# Patient Record
Sex: FEMALE | Race: WHITE | NOT HISPANIC OR LATINO | Employment: OTHER | URBAN - METROPOLITAN AREA
[De-identification: names, ages, dates, MRNs, and addresses within clinical notes are randomized per-mention and may not be internally consistent; named-entity substitution may affect disease eponyms.]

---

## 2020-01-27 NOTE — TELEPHONE ENCOUNTER
DIAGNOSIS: L foot pain / uncertain of dates of surgery and imaging / [facility] to send records / appt per pt   APPOINTMENT DATE: 3/10   NOTES STATUS DETAILS   OFFICE NOTE from referring provider N/A    OFFICE NOTE from other specialist Care Everywhere [facility]   DISCHARGE SUMMARY from hospital N/A    DISCHARGE REPORT from the ER N/A    OPERATIVE REPORT Care Everywhere [facility]   8/2/07  10/31/06   MEDICATION LIST Care Everywhere    MRI n/a    CT SCAN N/A    XRAYS (IMAGES & REPORTS) recieved tara 11/4/19           Sent fax request to [facility]ara for imaging 1/27    Action 3.5.20 sv    Action Taken Received image 11/4/19 from [facility]

## 2020-01-28 ENCOUNTER — DOCUMENTATION ONLY (OUTPATIENT)
Dept: CARE COORDINATION | Facility: CLINIC | Age: 70
End: 2020-01-28

## 2020-02-25 DIAGNOSIS — M79.672 LEFT FOOT PAIN: Primary | ICD-10-CM

## 2020-03-10 ENCOUNTER — PRE VISIT (OUTPATIENT)
Dept: ORTHOPEDICS | Facility: CLINIC | Age: 70
End: 2020-03-10

## 2020-04-21 ENCOUNTER — TELEPHONE (OUTPATIENT)
Dept: ORTHOPEDICS | Facility: CLINIC | Age: 70
End: 2020-04-21

## 2020-04-21 NOTE — TELEPHONE ENCOUNTER
Called patient to reschedule due to COVID crisis. LVM to call back and reschedule.    Layne Hartmann, ATC

## 2020-04-28 ENCOUNTER — TELEPHONE (OUTPATIENT)
Dept: ORTHOPEDICS | Facility: CLINIC | Age: 70
End: 2020-04-28

## 2020-04-28 NOTE — TELEPHONE ENCOUNTER
Called pt and LVM to reschedule appt 6-8 weeks from date of appt due to COVID 19. Left clinic number.

## 2020-04-29 ENCOUNTER — TELEPHONE (OUTPATIENT)
Dept: ORTHOPEDICS | Facility: CLINIC | Age: 70
End: 2020-04-29

## 2020-05-05 ENCOUNTER — PRE VISIT (OUTPATIENT)
Dept: ORTHOPEDICS | Facility: CLINIC | Age: 70
End: 2020-05-05

## 2020-06-02 ENCOUNTER — OFFICE VISIT (OUTPATIENT)
Dept: ORTHOPEDICS | Facility: CLINIC | Age: 70
End: 2020-06-02
Payer: COMMERCIAL

## 2020-06-02 ENCOUNTER — ANCILLARY PROCEDURE (OUTPATIENT)
Dept: GENERAL RADIOLOGY | Facility: CLINIC | Age: 70
End: 2020-06-02
Attending: ORTHOPAEDIC SURGERY
Payer: COMMERCIAL

## 2020-06-02 VITALS — BODY MASS INDEX: 28.77 KG/M2 | WEIGHT: 179 LBS | HEIGHT: 66 IN

## 2020-06-02 DIAGNOSIS — M25.572 PAIN IN JOINT, ANKLE AND FOOT, LEFT: Primary | ICD-10-CM

## 2020-06-02 DIAGNOSIS — M79.672 LEFT FOOT PAIN: ICD-10-CM

## 2020-06-02 RX ORDER — OXYCODONE HYDROCHLORIDE 10 MG/1
5 TABLET ORAL EVERY 6 HOURS PRN
COMMUNITY
Start: 2020-05-27

## 2020-06-02 RX ORDER — LISINOPRIL 10 MG/1
TABLET ORAL
COMMUNITY
Start: 2019-07-30 | End: 2023-05-13

## 2020-06-02 RX ORDER — SERTRALINE HYDROCHLORIDE 100 MG/1
150 TABLET, FILM COATED ORAL
COMMUNITY
Start: 2020-04-05 | End: 2023-05-13

## 2020-06-02 RX ORDER — TRIAMCINOLONE ACETONIDE 1 MG/G
CREAM TOPICAL
COMMUNITY
Start: 2020-03-23 | End: 2023-05-13

## 2020-06-02 RX ORDER — CLONIDINE HYDROCHLORIDE 0.1 MG/1
TABLET ORAL
COMMUNITY
Start: 2019-08-12 | End: 2023-05-13

## 2020-06-02 RX ORDER — LORAZEPAM 0.5 MG/1
0.25 TABLET ORAL EVERY 8 HOURS PRN
COMMUNITY
Start: 2020-03-23

## 2020-06-02 RX ORDER — NAPROXEN 500 MG/1
500 TABLET ORAL 2 TIMES DAILY PRN
COMMUNITY
Start: 2019-12-04 | End: 2023-10-08

## 2020-06-02 RX ORDER — ATORVASTATIN CALCIUM 80 MG/1
TABLET, FILM COATED ORAL
COMMUNITY
Start: 2019-10-24 | End: 2023-05-13

## 2020-06-02 ASSESSMENT — MIFFLIN-ST. JEOR: SCORE: 1353.69

## 2020-06-02 NOTE — LETTER
6/2/2020         RE: Patricia Mcnamara  [address] [place] [address]        Dear Colleague,    Thank you for referring your patient, Patricia Mcnamara, to the [facility]. Please see a copy of my visit note below.    CHIEF COMPLAINT:  Left foot pain.      HISTORY OF PRESENT ILLNESS:  Ms. Mcnamara is a 70-year-old female who presents today for evaluation of the left foot.  The patient reports to have pain and discomfort along the left foot which is in a variety of occasions.  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  Reports now to be very physically active.      The patient has had 2 surgeries with Dr. Jw Hayes and 1 by Dr. Calderón at [facility] who is now in a different practice.      She reports to have enough pain and discomfort to the point that she may consider surgery.  However, she is very clear about the fact that she scared of having surgery and she is not interested at all in undergoing surgery.      The patient has not had any formal treatment for the foot.  She will point out that the main area of pain and discomfort for her will be the lateral aspect of the foot.  She also reports to have a struggle with clawing of the lesser toes.      She reports to be a smoker and also reports to have gained some weight, which she believes is secondary to her [facility].      PAST MEDICAL HISTORY:  Hypertension, high cholesterol, among others.      PAST SURGICAL HISTORY:  Reviewed today.      DRUG ALLERGIES:  Sulfa drugs.      CURRENT MEDICATIONS:  Please refer to encounter form.      PHYSICAL EXAMINATION:  On today's visit, she presents as a pleasant female in no apparent distress with a height of 5 feet 6 inches and a weight of 179 pounds.  Denies to have any constitutional symptoms.      On today's visit, she presents with full range of motion of the left ankle,  hindfoot and midfoot joints.  CMS intact.  Skin intact.  Presents with some slight decrease for inversion and eversion but still presents approximately 70% of expected.  There are well-healed surgical incisions.  Presents with some swelling along the dorsal aspect of the naviculocuneiform joints.  Alignment of the toes is excellent.      RADIOGRAPHIC EVALUATION:  Three views of the left foot were obtained today which were significant for showing some hardware across the former first tarsometatarsal joint, which seems to be completely healed and in excellent alignment.  She also presented with some hardware across the calcaneocuboid joint and possible lateral column lengthening.  However, this one does not seem to be healing, though there is no failure of the hardware.      The patient otherwise presents with no acute findings.      ASSESSMENT:    1.  Left midfoot arthritis.   2.  Status post left first tarsometatarsal joint arthrodesis.   3.  Possible left calcaneocuboid joint nonunion.      PLAN:  I discussed with the patient that at this point, unfortunately, the only way we will be able to improve her discomfort will be by performing a procedure, something that she is not very much in favor off.      For the time being, she would like to do some thinking and she will contact us if she wishes to proceed with a different use of anti-inflammatory medications as currently she is using Aleve.  I discussed with her that maybe the use of Voltaren 75 mg p.o. b.i.d. will be of benefit to her.      The alternative to that approach will be to consider a CT scan with the understanding that we will obtain a CT scan if, in fact, she has an interest in undergoing a surgical intervention.  CT scan will be obtained on the left foot to rule out nonunion.      All questions were answered.  Patient was pleased with the discussion.  The patient will follow up accordingly.  In the meantime, she has no activity restrictions.      TT 30  minutes, CT 20 minutes.         Again, thank you for allowing me to participate in the care of your patient.        Sincerely,        Crow Thomas MD

## 2020-06-02 NOTE — NURSING NOTE
"Reason For Visit:   Chief Complaint   Patient presents with     Consult     Left foot pain. Patient has had three prior surgeries. Last surgery is when the pain begin.        Ht 1.676 m (5' 6\")   Wt 81.2 kg (179 lb)   BMI 28.89 kg/m      Pain Assessment  Patient Currently in Pain: Yes  0-10 Pain Scale: 7  Primary Pain Location: Foot    Layne Hartmann ATC    "

## 2020-06-02 NOTE — PROGRESS NOTES
CHIEF COMPLAINT:  Left foot pain.      HISTORY OF PRESENT ILLNESS:  Ms. Mcnamara is a 70-year-old female who presents today for evaluation of the left foot.  The patient reports to have pain and discomfort along the left foot which is in a variety of occasions.  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  Reports now to be very physically active.      The patient has had 2 surgeries with Dr. Jw Hayes and 1 by Dr. Calderón at [facility] who is now in a different practice.      She reports to have enough pain and discomfort to the point that she may consider surgery.  However, she is very clear about the fact that she scared of having surgery and she is not interested at all in undergoing surgery.      The patient has not had any formal treatment for the foot.  She will point out that the main area of pain and discomfort for her will be the lateral aspect of the foot.  She also reports to have a struggle with clawing of the lesser toes.      She reports to be a smoker and also reports to have gained some weight, which she believes is secondary to her custodial.      PAST MEDICAL HISTORY:  Hypertension, high cholesterol, among others.      PAST SURGICAL HISTORY:  Reviewed today.      DRUG ALLERGIES:  Sulfa drugs.      CURRENT MEDICATIONS:  Please refer to encounter form.      PHYSICAL EXAMINATION:  On today's visit, she presents as a pleasant female in no apparent distress with a height of 5 feet 6 inches and a weight of 179 pounds.  Denies to have any constitutional symptoms.      On today's visit, she presents with full range of motion of the left ankle, hindfoot and midfoot joints.  CMS intact.  Skin intact.  Presents with some slight decrease for inversion and eversion but still presents approximately 70% of expected.  There are well-healed surgical incisions.  Presents with some  swelling along the dorsal aspect of the naviculocuneiform joints.  Alignment of the toes is excellent.      RADIOGRAPHIC EVALUATION:  Three views of the left foot were obtained today which were significant for showing some hardware across the former first tarsometatarsal joint, which seems to be completely healed and in excellent alignment.  She also presented with some hardware across the calcaneocuboid joint and possible lateral column lengthening.  However, this one does not seem to be healing, though there is no failure of the hardware.      The patient otherwise presents with no acute findings.      ASSESSMENT:    1.  Left midfoot arthritis.   2.  Status post left first tarsometatarsal joint arthrodesis.   3.  Possible left calcaneocuboid joint nonunion.      PLAN:  I discussed with the patient that at this point, unfortunately, the only way we will be able to improve her discomfort will be by performing a procedure, something that she is not very much in favor off.      For the time being, she would like to do some thinking and she will contact us if she wishes to proceed with a different use of anti-inflammatory medications as currently she is using Aleve.  I discussed with her that maybe the use of Voltaren 75 mg p.o. b.i.d. will be of benefit to her.      The alternative to that approach will be to consider a CT scan with the understanding that we will obtain a CT scan if, in fact, she has an interest in undergoing a surgical intervention.  CT scan will be obtained on the left foot to rule out nonunion.      All questions were answered.  Patient was pleased with the discussion.  The patient will follow up accordingly.  In the meantime, she has no activity restrictions.      TT 30 minutes, CT 20 minutes.

## 2020-07-28 ENCOUNTER — TELEPHONE (OUTPATIENT)
Dept: ORTHOPEDICS | Facility: CLINIC | Age: 70
End: 2020-07-28

## 2020-07-28 DIAGNOSIS — M79.672 LEFT FOOT PAIN: Primary | ICD-10-CM

## 2020-07-28 NOTE — TELEPHONE ENCOUNTER
Received voicemail message from patient requesting to let Dory know that she would like to proceed with surgery with Dr Thomas. Patient can be reached at [phone], and is best reachable after 12:00 or 1:00pm.

## 2020-08-03 ENCOUNTER — TELEPHONE (OUTPATIENT)
Dept: ORTHOPEDICS | Facility: CLINIC | Age: 70
End: 2020-08-03

## 2020-08-03 NOTE — TELEPHONE ENCOUNTER
Patricia was called back by RN and we discussed the plan to have telephone visit next week after her CT scan.  Pt states she would like to plan for surgery on Wednesday September 2nd.   [employer] access letter was mailed to pt's home per pt request.  Dory Soni RN

## 2020-08-03 NOTE — TELEPHONE ENCOUNTER
[facility] Call Center    Phone Message    May a detailed message be left on voicemail: yes     Reason for Call: Other:   Pt realized that she has a telephone visit with William tomorrow to discuss the CAT scan which pt is not having done until Thursday. Pt wonders how long it will take for pt to get the scan results back? And pt would like to schedule the telephone appt accordingly. Please call back to advise and help schedule.     Action Taken: Other:  ortho    Travel Screening: Not Applicable

## 2020-08-03 NOTE — LETTER
[employer] Customer Service  [facility]  [address], [address]  [place] [address]  Fax: [phone]  Phone: [phone]      August 3, 2020      Patricia Mcnamara  [address] [place] [address]        Dear Patricia,    Thank you for your interest in becoming a [employer] user!    Your access code is: [email or web address]  Expires: 2020  6:31 AM     Please access the [employer] website at [email or web address].  Below the ID and password fields, select the  Sign Up Now  as New User.  You will be prompted to enter the access code listed above as well as additional personal information.  Please follow the directions carefully when creating your username and password.    If you allow your access code to , or if you have any questions please call a [employer] Representative at [phone] during normal clinic hours.     Sincerely,      [employer] Customer Service  [facility]

## 2020-08-06 ENCOUNTER — ANCILLARY PROCEDURE (OUTPATIENT)
Dept: CT IMAGING | Facility: CLINIC | Age: 70
End: 2020-08-06
Attending: ORTHOPAEDIC SURGERY
Payer: COMMERCIAL

## 2020-08-06 DIAGNOSIS — M79.672 LEFT FOOT PAIN: ICD-10-CM

## 2020-08-11 ENCOUNTER — VIRTUAL VISIT (OUTPATIENT)
Dept: ORTHOPEDICS | Facility: CLINIC | Age: 70
End: 2020-08-11
Payer: COMMERCIAL

## 2020-08-11 DIAGNOSIS — M25.572 PAIN IN JOINT, ANKLE AND FOOT, LEFT: Primary | ICD-10-CM

## 2020-08-11 NOTE — PROGRESS NOTES
CHIEF COMPLAINT:   1.  Left midfoot arthritis.   2.  Left calcaneocuboid joint nonunion.   3.  Status post left first tarsometatarsal joint arthrodesis.      HISTORY OF PRESENT ILLNESS:  Mrs. Mcnamara was contacted today via phone with her approval secondary to the pandemic.      RADIOGRAPHIC EVALUATION:  A CT scan has been obtained which is significant for showing a nonunion of the calcaneocuboid joint with a large amount of osteoarthritis across the naviculocuneiform joints and to a lesser degree but is still significant across the talonavicular joint.      PLAN:  I discussed with the patient that if, in fact, she is interested in undergoing a surgical procedure to improve her condition, we will consider the possibility of a diagnostic injection across the left talonavicular and naviculocuneiform joints to understand if, in fact, the surgery will provide her with relief that she is expecting.      Once she responds positively to the injections, then she will return to clinic to discuss in more detail and in depth with regards to what it would entail to recover from the surgery.      All questions were answered.  The patient was pleased with the discussion.  The patient has no activity restrictions.      TT:  15 minutes.  CT:  10 minutes.

## 2020-08-11 NOTE — LETTER
8/11/2020         RE: Patricia Mcnamara  [address] [place] [address]        Dear Colleague,    Thank you for referring your patient, Patricia Mcnamara, to the [facility]. Please see a copy of my visit note below.    CHIEF COMPLAINT:   1.  Left midfoot arthritis.   2.  Left calcaneocuboid joint nonunion.   3.  Status post left first tarsometatarsal joint arthrodesis.      HISTORY OF PRESENT ILLNESS:  Mrs. Mcnamara was contacted today via phone with her approval secondary to the pandemic.      RADIOGRAPHIC EVALUATION:  A CT scan has been obtained which is significant for showing a nonunion of the calcaneocuboid joint with a large amount of osteoarthritis across the naviculocuneiform joints and to a lesser degree but is still significant across the talonavicular joint.      PLAN:  I discussed with the patient that if, in fact, she is interested in undergoing a surgical procedure to improve her condition, we will consider the possibility of a diagnostic injection across the left talonavicular and naviculocuneiform joints to understand if, in fact, the surgery will provide her with relief that she is expecting.      Once she responds positively to the injections, then she will return to clinic to discuss in more detail and in depth with regards to what it would entail to recover from the surgery.      All questions were answered.  The patient was pleased with the discussion.  The patient has no activity restrictions.      TT:  15 minutes.  CT:  10 minutes.     Crow Thomas MD

## 2020-08-27 ENCOUNTER — TELEPHONE (OUTPATIENT)
Dept: ORTHOPEDICS | Facility: CLINIC | Age: 70
End: 2020-08-27

## 2020-08-27 DIAGNOSIS — Z11.59 ENCOUNTER FOR SCREENING FOR OTHER VIRAL DISEASES: Primary | ICD-10-CM

## 2020-08-27 DIAGNOSIS — M25.572 PAIN IN JOINT, ANKLE AND FOOT, LEFT: Primary | ICD-10-CM

## 2020-08-27 NOTE — TELEPHONE ENCOUNTER
Patricia was called back by RN and we reviewed the plan for diagnostic and therapeutic left talonavicular and naviculocuneiform joint injections.  A pain log will be mailed to pt's home to help document the effect of the medications, local and steroid.  Dory Soni RN

## 2020-08-27 NOTE — TELEPHONE ENCOUNTER
[facility] Call Center    Phone Message    May a detailed message be left on voicemail: yes     Reason for Call: Other: patient was needing to know next steps, she thought something was supposed to be scheduled for her. please call pt back     Action Taken: Message routed to:  [facility] Dr Crow Thomas    Travel Screening: Not Applicable

## 2020-08-28 DIAGNOSIS — Z11.59 ENCOUNTER FOR SCREENING FOR OTHER VIRAL DISEASES: ICD-10-CM

## 2020-08-28 PROCEDURE — U0003 INFECTIOUS AGENT DETECTION BY NUCLEIC ACID (DNA OR RNA); SEVERE ACUTE RESPIRATORY SYNDROME CORONAVIRUS 2 (SARS-COV-2) (CORONAVIRUS DISEASE [COVID-19]), AMPLIFIED PROBE TECHNIQUE, MAKING USE OF HIGH THROUGHPUT TECHNOLOGIES AS DESCRIBED BY CMS-2020-01-R: HCPCS | Performed by: ORTHOPAEDIC SURGERY

## 2020-08-29 LAB
SARS-COV-2 RNA SPEC QL NAA+PROBE: NOT DETECTED
SPECIMEN SOURCE: NORMAL

## 2020-09-01 ENCOUNTER — ANCILLARY PROCEDURE (OUTPATIENT)
Dept: GENERAL RADIOLOGY | Facility: CLINIC | Age: 70
End: 2020-09-01
Attending: ORTHOPAEDIC SURGERY
Payer: COMMERCIAL

## 2020-09-01 DIAGNOSIS — M25.572 PAIN IN JOINT, ANKLE AND FOOT, LEFT: ICD-10-CM

## 2020-09-01 RX ORDER — BUPIVACAINE HYDROCHLORIDE 2.5 MG/ML
10 INJECTION, SOLUTION EPIDURAL; INFILTRATION; INTRACAUDAL ONCE
Status: COMPLETED | OUTPATIENT
Start: 2020-09-01 | End: 2020-09-01

## 2020-09-01 RX ORDER — TRIAMCINOLONE ACETONIDE 40 MG/ML
40 INJECTION, SUSPENSION INTRA-ARTICULAR; INTRAMUSCULAR ONCE
Status: COMPLETED | OUTPATIENT
Start: 2020-09-01 | End: 2020-09-01

## 2020-09-01 RX ORDER — IOPAMIDOL 408 MG/ML
20 INJECTION, SOLUTION INTRATHECAL ONCE
Status: COMPLETED | OUTPATIENT
Start: 2020-09-01 | End: 2020-09-01

## 2020-09-01 RX ORDER — LIDOCAINE HYDROCHLORIDE 10 MG/ML
30 INJECTION, SOLUTION EPIDURAL; INFILTRATION; INTRACAUDAL; PERINEURAL ONCE
Status: COMPLETED | OUTPATIENT
Start: 2020-09-01 | End: 2020-09-01

## 2020-09-01 RX ADMIN — TRIAMCINOLONE ACETONIDE 40 MG: 40 INJECTION, SUSPENSION INTRA-ARTICULAR; INTRAMUSCULAR at 10:47

## 2020-09-01 RX ADMIN — BUPIVACAINE HYDROCHLORIDE 10 MG: 2.5 INJECTION, SOLUTION EPIDURAL; INFILTRATION; INTRACAUDAL at 10:49

## 2020-09-01 RX ADMIN — LIDOCAINE HYDROCHLORIDE 5 ML: 10 INJECTION, SOLUTION EPIDURAL; INFILTRATION; INTRACAUDAL; PERINEURAL at 10:46

## 2020-09-01 RX ADMIN — IOPAMIDOL 2 ML: 408 INJECTION, SOLUTION INTRATHECAL at 10:46

## 2020-11-23 ENCOUNTER — TELEPHONE (OUTPATIENT)
Dept: ORTHOPEDICS | Facility: CLINIC | Age: 70
End: 2020-11-23

## 2020-11-23 NOTE — TELEPHONE ENCOUNTER
I called Justine back this afternoon. She was calling to discuss her pain chart s/p injection that was completed 9/1. She said she got busy and forgot to call back. She is interested in having another injection rather than going to surgery. She states the injection did help and she would like another one. I helped her set up a phone call with Dr. Thomas for tomorrow.     Layne MENJIVAR ATC

## 2020-11-23 NOTE — TELEPHONE ENCOUNTER
[facility] Call Center    Phone Message    May a detailed message be left on voicemail: no     Reason for Call: Other: Patient returning a call from [place]     Action Taken: Message routed to:  [facility]    Travel Screening: Not Applicable

## 2020-11-24 ENCOUNTER — VIRTUAL VISIT (OUTPATIENT)
Dept: ORTHOPEDICS | Facility: CLINIC | Age: 70
End: 2020-11-24
Payer: COMMERCIAL

## 2020-11-24 DIAGNOSIS — M25.572 PAIN IN JOINT, ANKLE AND FOOT, LEFT: Primary | ICD-10-CM

## 2020-11-24 PROCEDURE — 99212 OFFICE O/P EST SF 10 MIN: CPT | Mod: TEL | Performed by: ORTHOPAEDIC SURGERY

## 2020-11-24 NOTE — LETTER
11/24/2020         RE: Patricia Mcnamara  [address] [place] [address]        Dear Colleague,    Thank you for referring your patient, Patricia Mcnamara, to the [facility] [place]. Please see a copy of my visit note below.    TELEPHONE VISIT      CHIEF COMPLAINT:  Left foot arthritis.      HISTORY OF PRESENT ILLNESS:  Mrs. Mcnamara was contacted today via phone secondary to the pandemic.  Patient authorized the encounter.      The patient reports to have quite a bit of help from the injection, which was performed on 09/01/2020.  The injection consisted of a naviculocuneiform and talonavicular joint injections.      The patient reports to have an interest in pursuing injections and to postpone surgery as much as possible, which I think is very realistic.      ASSESSMENT:  Left ankle arthritis.      PLAN:  I discussed with the patient that we are going to proceed with an order for a naviculocuneiform and talonavicular joint injections on the left foot with lidocaine and Kenalog for diagnosis of osteoarthritis.  The injections will be performed under fluoroscopic control.      The patient will follow up on a p.r.n. basis and she will be granted to proceed with injections on her left foot, as long as they are 3 months apart.  In the meantime, she has no activity restrictions.      All questions were answered.      TT 15 minutes, CT 10 minutes.           Again, thank you for allowing me to participate in the care of your patient.        Sincerely,        Crow Thomas MD

## 2020-11-24 NOTE — LETTER
Date:December 3, 2020      Patient was self referred, no letter generated. Do not send.        [facility] Health Information

## 2020-11-24 NOTE — PROGRESS NOTES
TELEPHONE VISIT      CHIEF COMPLAINT:  Left foot arthritis.      HISTORY OF PRESENT ILLNESS:  Mrs. Mcnamara was contacted today via phone secondary to the pandemic.  Patient authorized the encounter.      The patient reports to have quite a bit of help from the injection, which was performed on 09/01/2020.  The injection consisted of a naviculocuneiform and talonavicular joint injections.      The patient reports to have an interest in pursuing injections and to postpone surgery as much as possible, which I think is very realistic.      ASSESSMENT:  Left ankle arthritis.      PLAN:  I discussed with the patient that we are going to proceed with an order for a naviculocuneiform and talonavicular joint injections on the left foot with lidocaine and Kenalog for diagnosis of osteoarthritis.  The injections will be performed under fluoroscopic control.      The patient will follow up on a p.r.n. basis and she will be granted to proceed with injections on her left foot, as long as they are 3 months apart.  In the meantime, she has no activity restrictions.      All questions were answered.      TT 15 minutes, CT 10 minutes.

## 2020-12-03 ENCOUNTER — TELEPHONE (OUTPATIENT)
Dept: ORTHOPEDICS | Facility: CLINIC | Age: 70
End: 2020-12-03

## 2020-12-03 DIAGNOSIS — M25.572 PAIN IN JOINT, ANKLE AND FOOT, LEFT: Primary | ICD-10-CM

## 2020-12-03 NOTE — TELEPHONE ENCOUNTER
[facility] Call Center    Phone Message    May a detailed message be left on voicemail: yes     Reason for Call: Other: Pt is calling in to speak with Dory about her injections that Dr. Thomas wanted her to have, Pt is requesting Dory call you back      Action Taken: Message routed to:  [facility] Ortho    Travel Screening: Not Applicable

## 2020-12-07 DIAGNOSIS — Z11.59 ENCOUNTER FOR SCREENING FOR OTHER VIRAL DISEASES: Primary | ICD-10-CM

## 2020-12-07 NOTE — TELEPHONE ENCOUNTER
Patricia was called back by RN and we reviewed getting injections into her foot joints per Dr Thomas's note.  Patricia is not diabetic, on no blood thinners.  She was given radiology scheduling number to set it up.  Dory Adams RN

## 2021-01-09 DIAGNOSIS — Z11.59 ENCOUNTER FOR SCREENING FOR OTHER VIRAL DISEASES: ICD-10-CM

## 2021-01-09 LAB
SARS-COV-2 RNA RESP QL NAA+PROBE: NORMAL
SPECIMEN SOURCE: NORMAL

## 2021-01-09 PROCEDURE — 87635 SARS-COV-2 COVID-19 AMP PRB: CPT | Performed by: PATHOLOGY

## 2021-01-10 ENCOUNTER — HEALTH MAINTENANCE LETTER (OUTPATIENT)
Age: 71
End: 2021-01-10

## 2021-01-10 LAB
LABORATORY COMMENT REPORT: NORMAL
SARS-COV-2 RNA RESP QL NAA+PROBE: NEGATIVE
SPECIMEN SOURCE: NORMAL

## 2021-01-12 ENCOUNTER — ANCILLARY PROCEDURE (OUTPATIENT)
Dept: GENERAL RADIOLOGY | Facility: CLINIC | Age: 71
End: 2021-01-12
Attending: ORTHOPAEDIC SURGERY
Payer: COMMERCIAL

## 2021-01-12 DIAGNOSIS — M25.572 PAIN IN JOINT, ANKLE AND FOOT, LEFT: ICD-10-CM

## 2021-01-12 PROCEDURE — 77002 NEEDLE LOCALIZATION BY XRAY: CPT | Mod: GC | Performed by: RADIOLOGY

## 2021-01-12 PROCEDURE — 20605 DRAIN/INJ JOINT/BURSA W/O US: CPT | Mod: LT | Performed by: RADIOLOGY

## 2021-01-12 PROCEDURE — 20605 DRAIN/INJ JOINT/BURSA W/O US: CPT | Mod: XS | Performed by: RADIOLOGY

## 2021-01-12 RX ORDER — BUPIVACAINE HYDROCHLORIDE 2.5 MG/ML
10 INJECTION, SOLUTION EPIDURAL; INFILTRATION; INTRACAUDAL ONCE
Status: COMPLETED | OUTPATIENT
Start: 2021-01-12 | End: 2021-01-12

## 2021-01-12 RX ORDER — IOPAMIDOL 408 MG/ML
10 INJECTION, SOLUTION INTRATHECAL ONCE
Status: COMPLETED | OUTPATIENT
Start: 2021-01-12 | End: 2021-01-12

## 2021-01-12 RX ORDER — LIDOCAINE HYDROCHLORIDE 10 MG/ML
30 INJECTION, SOLUTION EPIDURAL; INFILTRATION; INTRACAUDAL; PERINEURAL ONCE
Status: COMPLETED | OUTPATIENT
Start: 2021-01-12 | End: 2021-01-12

## 2021-01-12 RX ORDER — TRIAMCINOLONE ACETONIDE 40 MG/ML
40 INJECTION, SUSPENSION INTRA-ARTICULAR; INTRAMUSCULAR ONCE
Status: COMPLETED | OUTPATIENT
Start: 2021-01-12 | End: 2021-01-12

## 2021-01-12 RX ADMIN — TRIAMCINOLONE ACETONIDE 40 MG: 40 INJECTION, SUSPENSION INTRA-ARTICULAR; INTRAMUSCULAR at 11:34

## 2021-01-12 RX ADMIN — BUPIVACAINE HYDROCHLORIDE 5 MG: 2.5 INJECTION, SOLUTION EPIDURAL; INFILTRATION; INTRACAUDAL at 11:32

## 2021-01-12 RX ADMIN — LIDOCAINE HYDROCHLORIDE 5 ML: 10 INJECTION, SOLUTION EPIDURAL; INFILTRATION; INTRACAUDAL; PERINEURAL at 11:33

## 2021-01-12 RX ADMIN — IOPAMIDOL 2 ML: 408 INJECTION, SOLUTION INTRATHECAL at 11:32

## 2021-03-10 ENCOUNTER — TELEPHONE (OUTPATIENT)
Dept: ORTHOPEDICS | Facility: CLINIC | Age: 71
End: 2021-03-10

## 2021-03-10 NOTE — TELEPHONE ENCOUNTER
[facility] Call Center    Phone Message    May a detailed message be left on voicemail: yes     Reason for Call: Other: This pt of Dr. Thomas's  called to speak with Dory or someone on Dr. Thomas's care team. This pt want to speak with Dory regarding her recent injection. Please have Dory reach back out to this pt at her convenience.     Action Taken: Message routed to:  [facility] Ortho    Travel Screening: Not Applicable

## 2021-03-11 NOTE — TELEPHONE ENCOUNTER
I called and spoke with Patricia this morning regarding her injections. She states that for this last set of injections that she thinks the doctor did the same injections twice as they went in from the top both times. For her injections in September the doctor that gave the injections did one from the top and one from the side of the foot. I took a look at her images from both injections and I do see that the doctors took different approaches but this last injection was put in the correct two places. She will call us back in April to have a repeat injection.     Layne Hartmann, ATC

## 2021-05-03 ENCOUNTER — TELEPHONE (OUTPATIENT)
Dept: ORTHOPEDICS | Facility: CLINIC | Age: 71
End: 2021-05-03

## 2021-05-03 DIAGNOSIS — M25.572 PAIN IN JOINT, ANKLE AND FOOT, LEFT: Primary | ICD-10-CM

## 2021-05-03 NOTE — TELEPHONE ENCOUNTER
Patricia was called back and we reviewed her two injections  Fluoroscopy guided left talonavicular joint steroid injection   Fluoroscopy guided left navicular cuneiform joint steroid injection.    Pt states she will call radiology to schedule.    Dory Soni RN

## 2021-05-03 NOTE — TELEPHONE ENCOUNTER
[facility] Call Center    Phone Message:  Pt would like a call back to have her questions answered about types of injections, and to also schedule an injection.    May a detailed message be left on voicemail: Yes     Reason for Call: Other: INJECTION Questions and Scheduling      Action Taken: Message routed to:  [facility] Team    Travel Screening: Not Applicable

## 2021-05-18 DIAGNOSIS — Z11.59 ENCOUNTER FOR SCREENING FOR OTHER VIRAL DISEASES: ICD-10-CM

## 2021-05-27 ENCOUNTER — RECORDS - HEALTHEAST (OUTPATIENT)
Dept: ADMINISTRATIVE | Facility: CLINIC | Age: 71
End: 2021-05-27

## 2021-05-30 DIAGNOSIS — Z11.59 ENCOUNTER FOR SCREENING FOR OTHER VIRAL DISEASES: ICD-10-CM

## 2021-05-30 LAB
LABORATORY COMMENT REPORT: NORMAL
SARS-COV-2 RNA RESP QL NAA+PROBE: NEGATIVE
SARS-COV-2 RNA RESP QL NAA+PROBE: NORMAL
SPECIMEN SOURCE: NORMAL
SPECIMEN SOURCE: NORMAL

## 2021-05-30 PROCEDURE — U0003 INFECTIOUS AGENT DETECTION BY NUCLEIC ACID (DNA OR RNA); SEVERE ACUTE RESPIRATORY SYNDROME CORONAVIRUS 2 (SARS-COV-2) (CORONAVIRUS DISEASE [COVID-19]), AMPLIFIED PROBE TECHNIQUE, MAKING USE OF HIGH THROUGHPUT TECHNOLOGIES AS DESCRIBED BY CMS-2020-01-R: HCPCS | Performed by: ORTHOPAEDIC SURGERY

## 2021-05-30 PROCEDURE — U0005 INFEC AGEN DETEC AMPLI PROBE: HCPCS | Performed by: ORTHOPAEDIC SURGERY

## 2021-06-01 ENCOUNTER — RECORDS - HEALTHEAST (OUTPATIENT)
Dept: ADMINISTRATIVE | Facility: CLINIC | Age: 71
End: 2021-06-01

## 2021-06-03 ENCOUNTER — ANCILLARY PROCEDURE (OUTPATIENT)
Dept: GENERAL RADIOLOGY | Facility: CLINIC | Age: 71
End: 2021-06-03
Attending: ORTHOPAEDIC SURGERY
Payer: COMMERCIAL

## 2021-06-03 DIAGNOSIS — M25.572 PAIN IN JOINT, ANKLE AND FOOT, LEFT: ICD-10-CM

## 2021-06-03 PROCEDURE — 20605 DRAIN/INJ JOINT/BURSA W/O US: CPT | Mod: LT | Performed by: RADIOLOGY

## 2021-06-03 PROCEDURE — 77002 NEEDLE LOCALIZATION BY XRAY: CPT | Mod: GC | Performed by: RADIOLOGY

## 2021-06-03 PROCEDURE — 20605 DRAIN/INJ JOINT/BURSA W/O US: CPT | Mod: XS | Performed by: RADIOLOGY

## 2021-06-03 RX ORDER — LIDOCAINE HYDROCHLORIDE 10 MG/ML
30 INJECTION, SOLUTION EPIDURAL; INFILTRATION; INTRACAUDAL; PERINEURAL ONCE
Status: COMPLETED | OUTPATIENT
Start: 2021-06-03 | End: 2021-06-03

## 2021-06-03 RX ORDER — IOPAMIDOL 408 MG/ML
10 INJECTION, SOLUTION INTRATHECAL ONCE
Status: COMPLETED | OUTPATIENT
Start: 2021-06-03 | End: 2021-06-03

## 2021-06-03 RX ORDER — BUPIVACAINE HYDROCHLORIDE 2.5 MG/ML
10 INJECTION, SOLUTION EPIDURAL; INFILTRATION; INTRACAUDAL ONCE
Status: COMPLETED | OUTPATIENT
Start: 2021-06-03 | End: 2021-06-03

## 2021-06-03 RX ORDER — TRIAMCINOLONE ACETONIDE 40 MG/ML
40 INJECTION, SUSPENSION INTRA-ARTICULAR; INTRAMUSCULAR ONCE
Status: COMPLETED | OUTPATIENT
Start: 2021-06-03 | End: 2021-06-03

## 2021-06-03 RX ADMIN — BUPIVACAINE HYDROCHLORIDE 10 MG: 2.5 INJECTION, SOLUTION EPIDURAL; INFILTRATION; INTRACAUDAL at 11:34

## 2021-06-03 RX ADMIN — TRIAMCINOLONE ACETONIDE 40 MG: 40 INJECTION, SUSPENSION INTRA-ARTICULAR; INTRAMUSCULAR at 11:34

## 2021-06-03 RX ADMIN — IOPAMIDOL 2 ML: 408 INJECTION, SOLUTION INTRATHECAL at 11:34

## 2021-06-03 RX ADMIN — LIDOCAINE HYDROCHLORIDE 5 ML: 10 INJECTION, SOLUTION EPIDURAL; INFILTRATION; INTRACAUDAL; PERINEURAL at 11:34

## 2021-06-10 ENCOUNTER — TELEPHONE (OUTPATIENT)
Dept: ORTHOPEDICS | Facility: CLINIC | Age: 71
End: 2021-06-10

## 2021-06-10 NOTE — TELEPHONE ENCOUNTER
[facility] Call [facility]    Phone Message    May a detailed message be left on voicemail: yes     Reason for Call: Other: would like Dory to c/b and discuss what the recovery time is going to look like after surgery      Action Taken: Message routed to:  [facility] orjennifer    Travel Screening: Not Applicable     Can call anytime after 1PM

## 2021-06-11 NOTE — TELEPHONE ENCOUNTER
Patricia was called back by RN and voicemail was left that we would be happy to answer her questions when she calls back.  She has been receiving Left Talonavicular and navicular cuneiform injections.  We have not seen a surgery plan, so it's not clear we can say hat surgery Dr Thomas plans.  If surgery, pt will need telephone or clinic visit before scheduling.  Dory Soni RN

## 2021-07-08 ENCOUNTER — TELEPHONE (OUTPATIENT)
Dept: ORTHOPEDICS | Facility: CLINIC | Age: 71
End: 2021-07-08

## 2021-07-08 DIAGNOSIS — M25.572 PAIN IN JOINT, ANKLE AND FOOT, LEFT: ICD-10-CM

## 2021-07-08 DIAGNOSIS — M79.672 LEFT FOOT PAIN: Primary | ICD-10-CM

## 2021-07-08 NOTE — TELEPHONE ENCOUNTER
I called the patient back and let her know that Dory is out of the office today, returning tomorrow. She had questions regarding surgery and getting on the schedule. I will have Dory call her back tomorrow. She asked that Dory wait until after 12pm to call her.     Layne

## 2021-07-08 NOTE — TELEPHONE ENCOUNTER
[facility] Call [facility]    Phone Message    May a detailed message be left on voicemail: yes     Reason for Call: Other: patient would like Dory to call back and go over a few questions she has regarding recovery time and to schedule surgery / get ball rolling -- would like a c/b anytime after 12 pm      Action Taken: Message routed to:  [facility] ortho    Travel Screening: Not Applicable

## 2021-07-09 NOTE — TELEPHONE ENCOUNTER
"Patricia was called back by RN regarding ankle surgery for arthritis.  We will have pt discuss with a telephone visit with Dr Thomas to review what surgery he recommends.  Pt smokes and when asking if she could stop, she states she can \"maybe decrease to 2-3 cigarettes/day\" if she has to around the time of surgery.  Pt takes 6-8 Oxycodone 10mg tablets for her foot and ankle pain, generally 8 tablets/day.  Pt would like to plan surgery in early September this year.  Dory Soni RN  "

## 2021-08-16 NOTE — TELEPHONE ENCOUNTER
Patricia was called back by RN.  Pt states she is too busy right now selling her vacation home in [place] to plan for surgery, would like to postpone until January 12,2022.  She will see Dr Thomas for review of plan on 12/7/21.  Pt asks to have repeat injections for left TN and NC joints since surgery will be postponed.  Pt's last injections were on 6/3/21, will wait until after 9/3/21 for the injections.  Dory Soni RN

## 2021-08-16 NOTE — TELEPHONE ENCOUNTER
[facility] Call Center    Phone Message    May a detailed message be left on voicemail: yes     Reason for Call: Other: This pt requested a call back from Dory when she has a free moment. Dory can reach out to the pt at either number listed in chart.      Action Taken: Message routed to:  [facility]o    Travel Screening: Not Applicable

## 2021-08-23 DIAGNOSIS — Z11.59 ENCOUNTER FOR SCREENING FOR OTHER VIRAL DISEASES: ICD-10-CM

## 2021-09-08 DIAGNOSIS — Z11.59 ENCOUNTER FOR SCREENING FOR OTHER VIRAL DISEASES: ICD-10-CM

## 2021-09-17 NOTE — TELEPHONE ENCOUNTER
[facility] Call Center    Phone Message    May a detailed message be left on voicemail: yes     Reason for Call: Other: This pt of Dr. Thomas's called to speak with Dory. The pt did not state what this was in regard to. Please have Dory reach back out to the pt at [phone].     Action Taken: Message routed to:  [facility] Ortho    Travel Screening: Not Applicable

## 2021-09-17 NOTE — TELEPHONE ENCOUNTER
Called patient back to discuss. I let her know that Dory has retired. Also discussed with her a recent mychart she had received regarding covid test information before her next injection. Patient also wanted it explained again the area in which she receives these injections - we went over this.

## 2021-09-24 ENCOUNTER — LAB (OUTPATIENT)
Dept: LAB | Facility: CLINIC | Age: 71
End: 2021-09-24

## 2021-09-24 DIAGNOSIS — Z11.59 ENCOUNTER FOR SCREENING FOR OTHER VIRAL DISEASES: ICD-10-CM

## 2021-09-24 PROCEDURE — U0005 INFEC AGEN DETEC AMPLI PROBE: HCPCS | Performed by: ORTHOPAEDIC SURGERY

## 2021-09-25 LAB — SARS-COV-2 RNA RESP QL NAA+PROBE: NEGATIVE

## 2021-09-28 ENCOUNTER — ANCILLARY PROCEDURE (OUTPATIENT)
Dept: GENERAL RADIOLOGY | Facility: CLINIC | Age: 71
End: 2021-09-28
Attending: ORTHOPAEDIC SURGERY
Payer: COMMERCIAL

## 2021-09-28 DIAGNOSIS — M25.572 PAIN IN JOINT, ANKLE AND FOOT, LEFT: ICD-10-CM

## 2021-09-28 PROCEDURE — 77002 NEEDLE LOCALIZATION BY XRAY: CPT | Mod: GC | Performed by: RADIOLOGY

## 2021-09-28 PROCEDURE — 20605 DRAIN/INJ JOINT/BURSA W/O US: CPT | Mod: LT | Performed by: RADIOLOGY

## 2021-09-28 RX ORDER — BUPIVACAINE HYDROCHLORIDE 2.5 MG/ML
10 INJECTION, SOLUTION EPIDURAL; INFILTRATION; INTRACAUDAL ONCE
Status: COMPLETED | OUTPATIENT
Start: 2021-09-28 | End: 2021-09-28

## 2021-09-28 RX ORDER — LIDOCAINE HYDROCHLORIDE 10 MG/ML
30 INJECTION, SOLUTION EPIDURAL; INFILTRATION; INTRACAUDAL; PERINEURAL ONCE
Status: COMPLETED | OUTPATIENT
Start: 2021-09-28 | End: 2021-09-28

## 2021-09-28 RX ORDER — IOPAMIDOL 408 MG/ML
10 INJECTION, SOLUTION INTRATHECAL ONCE
Status: COMPLETED | OUTPATIENT
Start: 2021-09-28 | End: 2021-09-28

## 2021-09-28 RX ORDER — TRIAMCINOLONE ACETONIDE 40 MG/ML
40 INJECTION, SUSPENSION INTRA-ARTICULAR; INTRAMUSCULAR ONCE
Status: COMPLETED | OUTPATIENT
Start: 2021-09-28 | End: 2021-09-28

## 2021-09-28 RX ADMIN — IOPAMIDOL 4 ML: 408 INJECTION, SOLUTION INTRATHECAL at 10:22

## 2021-09-28 RX ADMIN — LIDOCAINE HYDROCHLORIDE 5 ML: 10 INJECTION, SOLUTION EPIDURAL; INFILTRATION; INTRACAUDAL; PERINEURAL at 10:22

## 2021-09-28 RX ADMIN — BUPIVACAINE HYDROCHLORIDE 5 MG: 2.5 INJECTION, SOLUTION EPIDURAL; INFILTRATION; INTRACAUDAL at 10:22

## 2021-09-28 RX ADMIN — TRIAMCINOLONE ACETONIDE 40 MG: 40 INJECTION, SUSPENSION INTRA-ARTICULAR; INTRAMUSCULAR at 10:22

## 2021-10-23 ENCOUNTER — HEALTH MAINTENANCE LETTER (OUTPATIENT)
Age: 71
End: 2021-10-23

## 2021-12-07 ENCOUNTER — VIRTUAL VISIT (OUTPATIENT)
Dept: ORTHOPEDICS | Facility: CLINIC | Age: 71
End: 2021-12-07
Payer: COMMERCIAL

## 2021-12-07 ENCOUNTER — IMMUNIZATION (OUTPATIENT)
Dept: NURSING | Facility: CLINIC | Age: 71
End: 2021-12-07

## 2021-12-07 DIAGNOSIS — M25.572 PAIN IN JOINT, ANKLE AND FOOT, LEFT: Primary | ICD-10-CM

## 2021-12-07 PROCEDURE — 99213 OFFICE O/P EST LOW 20 MIN: CPT | Mod: 95 | Performed by: ORTHOPAEDIC SURGERY

## 2021-12-07 PROCEDURE — 91306 COVID-19,PF,MODERNA (18+ YRS BOOSTER .25ML): CPT

## 2021-12-07 PROCEDURE — 0064A COVID-19,PF,MODERNA (18+ YRS BOOSTER .25ML): CPT

## 2021-12-07 PROCEDURE — G0008 ADMIN INFLUENZA VIRUS VAC: HCPCS

## 2021-12-07 PROCEDURE — 90662 IIV NO PRSV INCREASED AG IM: CPT

## 2021-12-07 NOTE — LETTER
Date:December 10, 2021      Patient was self referred, no letter generated. Do not send.        [facility] Health Information

## 2021-12-07 NOTE — NURSING NOTE
RN called pt to discuss pre-surgical packet. Pt states CALIXTO Connors already went over information prior to this visit and pt is well-versed in pre-surgical procedure d/t multiple surgeries in past. Pt declined education at this time. Pt will call RN with any questions shall they arise. Pre-surgical packet mailed to pt.     Austin Bean RNCC

## 2021-12-07 NOTE — LETTER
12/7/2021         RE: Patricia Mcnamara  [address] [place] [address]        Dear Colleague,    Thank you for referring your patient, Patricia Mcnamara, to the [facility] [place]. Please see a copy of my visit note below.    CHIEF COMPLAINT:  Left talonavicular and naviculocuneiform arthritis.    HISTORY OF PRESENT ILLNESS:  Mrs. Mcnamara's visit was conducted via phone secondary to the pandemic.  The patient authorized the encounter.    PLAN:  We discussed with the patient that if, in fact, injections are losing efficacy, it would be reasonable to proceed with left talonavicular and naviculocuneiform joint arthrodesis.    I discussed with the patient the most likely postoperative course and complications from such intervention, which include but are not limited to infection, bleeding, nerve damage, residual pain, nonunion and stiffness.    All questions were answered.  Patient was pleased with the discussion.  The patient will schedule surgery at the best of her convenience.  Again, the surgery will consist of a left talonavicular and naviculocuneiform joint arthrodesis.    TT:  20 minutes.  CT:  15 minutes.          Again, thank you for allowing me to participate in the care of your patient.        Sincerely,        Crow Thomas MD

## 2021-12-07 NOTE — PROGRESS NOTES
"Chief Complaint  FU from [facility] discharge    Subjective          Shirley Calhoun presents to [facility]  History of Present Illness  F/U [facility] discharge   Aortagram   Left foot turned bright red while at PT  Sees Dr Andrade vascular surgeon 4/15 at 11:45 am  Ankle hurrhett   Says she will have 3 major surgeries    Objective   Vital Signs:   /60   Pulse 92   Temp 98.2 °F (36.8 °C)   Resp 24   Ht 152.4 cm (60\")   Wt 43 kg (94 lb 12.8 oz)   SpO2 97%   BMI 18.51 kg/m²     Physical Exam   Result Review :                 Assessment and Plan    There are no diagnoses linked to this encounter.    Follow Up   No follow-ups on file.  Patient was given instructions and counseling regarding her condition or for health maintenance advice. Please see specific information pulled into the AVS if appropriate.       " CHIEF COMPLAINT:  Left talonavicular and naviculocuneiform arthritis.    HISTORY OF PRESENT ILLNESS:  Mrs. Mcnamara's visit was conducted via phone secondary to the pandemic.  The patient authorized the encounter.    PLAN:  We discussed with the patient that if, in fact, injections are losing efficacy, it would be reasonable to proceed with left talonavicular and naviculocuneiform joint arthrodesis.    I discussed with the patient the most likely postoperative course and complications from such intervention, which include but are not limited to infection, bleeding, nerve damage, residual pain, nonunion and stiffness.    All questions were answered.  Patient was pleased with the discussion.  The patient will schedule surgery at the best of her convenience.  Again, the surgery will consist of a left talonavicular and naviculocuneiform joint arthrodesis.    TT:  20 minutes.  CT:  15 minutes.

## 2021-12-09 ENCOUNTER — TELEPHONE (OUTPATIENT)
Dept: ORTHOPEDICS | Facility: CLINIC | Age: 71
End: 2021-12-09
Payer: COMMERCIAL

## 2022-01-14 ENCOUNTER — TELEPHONE (OUTPATIENT)
Dept: ORTHOPEDICS | Facility: CLINIC | Age: 72
End: 2022-01-14
Payer: COMMERCIAL

## 2022-01-14 NOTE — TELEPHONE ENCOUNTER
RN returned call of pt. Pt states she had a fall after surgery when her scooter collapsed. She landed on her knee but did stub her toes. She states she had pain the day of the injury, but today has felt markedly better. She states her son was there to help her up immediately after her surgery. Pt does not feel it necessary to come in. RN stated that if pain increases or increased drainage happens, to contact us at clinic and then we can see her ahead of time. We will likely get x-rays at 2 week visit. Pt verbalized understanding.     Austin Bean, RNCC

## 2022-01-17 ENCOUNTER — TELEPHONE (OUTPATIENT)
Dept: ORTHOPEDICS | Facility: CLINIC | Age: 72
End: 2022-01-17
Payer: COMMERCIAL

## 2022-01-24 DIAGNOSIS — M79.672 LEFT FOOT PAIN: Primary | ICD-10-CM

## 2022-01-28 ENCOUNTER — ANCILLARY PROCEDURE (OUTPATIENT)
Dept: GENERAL RADIOLOGY | Facility: CLINIC | Age: 72
End: 2022-01-28
Attending: PHYSICIAN ASSISTANT
Payer: COMMERCIAL

## 2022-01-28 ENCOUNTER — OFFICE VISIT (OUTPATIENT)
Dept: ORTHOPEDICS | Facility: CLINIC | Age: 72
End: 2022-01-28
Payer: COMMERCIAL

## 2022-01-28 DIAGNOSIS — M79.672 LEFT FOOT PAIN: ICD-10-CM

## 2022-01-28 DIAGNOSIS — M53.3 COCCYX PAIN: ICD-10-CM

## 2022-01-28 DIAGNOSIS — M79.672 LEFT FOOT PAIN: Primary | ICD-10-CM

## 2022-01-28 PROCEDURE — 73630 X-RAY EXAM OF FOOT: CPT | Mod: LT | Performed by: RADIOLOGY

## 2022-01-28 PROCEDURE — 72220 X-RAY EXAM SACRUM TAILBONE: CPT | Performed by: RADIOLOGY

## 2022-01-28 PROCEDURE — 29405 APPL SHORT LEG CAST: CPT | Mod: 58

## 2022-01-28 PROCEDURE — 99024 POSTOP FOLLOW-UP VISIT: CPT

## 2022-01-28 NOTE — PROGRESS NOTES
"Reason for visit:    Patricia Mcnamara came in to the clinic for a two week post op check.    Her surgery was done 1/12/2022 by Dr Thomas.  She had a Left talonavicular and naviculocuneiform joints fusion.     Assessment:    Patricia came into the clinic in a short leg fiberglass cast Non-WB and using a scooter, accompanied by her .    The Surgical wounds were exposed and found to be well-healed; so the sutures were removed. Steri strips were applied. Skin is c/d/i.  Patricia report little to no pain. The foot is still mildly swollen, I encouraged her to continue elevating often. She is to be NWB for 3 months, but may use the foot for balance per Dr. Thomas at surgery.    Patricia did have a fall the day after surgery but reports that she is \"doing great!\" and not concerned about re injury, but just to be sure, foot x-rays were ordered. She does complain of tailbone pain since surgery, so coccyx was ordered also. Both performed in clinic today and reviewed by Alayna Velez PA-C. Hardware appears to be intact and in place, no know fractures seen. A message was sent to Dr. Thomas and his PA Yeni, so they are aware and can review images at their convenience.     Plan:     She was placed into a new short leg fiberglass cast.  She was told to remain Non-WB.     She has an appointment to see Dr. Thomas at 6 weeks post op with x-rays and at that time Dr. Thomas will determine further restrictions.    She has our phone number and will call with questions or problems.    Yesica Muller  Cast/splint application    Date/Time: 1/28/2022 11:04 AM  Performed by: Nurse, [facility]  Authorized by: Crow Thomas MD     Consent:     Consent obtained:  Verbal    Consent given by:  Patient  Pre-procedure details:     Sensation:  Normal  Procedure details:     Laterality:  Left    Location:  Foot    Foot:  L foot    Cast type:  Short leg    Supplies:  Fiberglass  Post-procedure details:     Pain:  Unchanged    Patient tolerance of procedure:  " Tolerated well, no immediate complications    Patient provided with cast or splint care instructions: Yes

## 2022-02-03 ENCOUNTER — TELEPHONE (OUTPATIENT)
Dept: ORTHOPEDICS | Facility: CLINIC | Age: 72
End: 2022-02-03
Payer: COMMERCIAL

## 2022-02-12 ENCOUNTER — HEALTH MAINTENANCE LETTER (OUTPATIENT)
Age: 72
End: 2022-02-12

## 2022-02-22 ENCOUNTER — OFFICE VISIT (OUTPATIENT)
Dept: ORTHOPEDICS | Facility: CLINIC | Age: 72
End: 2022-02-22
Payer: COMMERCIAL

## 2022-02-22 ENCOUNTER — ANCILLARY PROCEDURE (OUTPATIENT)
Dept: GENERAL RADIOLOGY | Facility: CLINIC | Age: 72
End: 2022-02-22
Attending: ORTHOPAEDIC SURGERY
Payer: COMMERCIAL

## 2022-02-22 VITALS — BODY MASS INDEX: 26.47 KG/M2 | WEIGHT: 164 LBS

## 2022-02-22 DIAGNOSIS — M79.672 LEFT FOOT PAIN: ICD-10-CM

## 2022-02-22 DIAGNOSIS — M25.572 PAIN IN JOINT, ANKLE AND FOOT, LEFT: Primary | ICD-10-CM

## 2022-02-22 PROCEDURE — 99024 POSTOP FOLLOW-UP VISIT: CPT | Performed by: ORTHOPAEDIC SURGERY

## 2022-02-22 PROCEDURE — 73630 X-RAY EXAM OF FOOT: CPT | Mod: LT | Performed by: RADIOLOGY

## 2022-02-22 NOTE — LETTER
Date:February 23, 2022      Patient was self referred, no letter generated. Do not send.        [facility] Health Information

## 2022-02-22 NOTE — LETTER
2/22/2022         RE: Patricia Mcnamara  [address] [place] [address]        Dear Colleague,    Thank you for referring your patient, Patricia Mcnamara, to the [facility] [place]. Please see a copy of my visit note below.    CHIEF COMPLAINT:  Status post left talonavicular and naviculocuneiform arthrodesis performed on 01/12/2022.    HISTORY OF PRESENT ILLNESS:  Ms. Mcnamara presents today for further followup.  Reports to be doing well.  Reports to be compliant.    PHYSICAL EXAMINATION:  On today's visit, she presents with well-healed surgical incisions.  Alignment is excellent.  CMS is intact.    IMAGING:  Three views of the left foot were obtained today, which are significant for showing excellent consolidation across the arthrodesis site.  Hardware is intact and in place.  Alignment is excellent.    ASSESSMENT:  Status post left talonavicular and naviculocuneiform arthrodesis.    PLAN:  I discussed with the patient that she is making progress according to the plan.  Today she is going to proceed with nonweightbearing for another month with the use of a short leg cast. At that time, a CT scan will be obtained.  Therefore, we will not need plain x-rays during the next followup appointment.    All questions were answered.  The patient was pleased with the discussion.  The patient will follow up accordingly.    TT:  20 minutes.  CT:  15 minutes.          Again, thank you for allowing me to participate in the care of your patient.        Sincerely,        Crow Thomas MD

## 2022-02-22 NOTE — PROGRESS NOTES
CHIEF COMPLAINT:  Status post left talonavicular and naviculocuneiform arthrodesis performed on 01/12/2022.    HISTORY OF PRESENT ILLNESS:  Ms. Mcnamara presents today for further followup.  Reports to be doing well.  Reports to be compliant.    PHYSICAL EXAMINATION:  On today's visit, she presents with well-healed surgical incisions.  Alignment is excellent.  CMS is intact.    IMAGING:  Three views of the left foot were obtained today, which are significant for showing excellent consolidation across the arthrodesis site.  Hardware is intact and in place.  Alignment is excellent.    ASSESSMENT:  Status post left talonavicular and naviculocuneiform arthrodesis.    PLAN:  I discussed with the patient that she is making progress according to the plan.  Today she is going to proceed with nonweightbearing for another month with the use of a short leg cast. At that time, a CT scan will be obtained.  Therefore, we will not need plain x-rays during the next followup appointment.    All questions were answered.  The patient was pleased with the discussion.  The patient will follow up accordingly.    TT:  20 minutes.  CT:  15 minutes.

## 2022-02-22 NOTE — NURSING NOTE
Reason For Visit:   Chief Complaint   Patient presents with     RECHECK     6 week POP Left TN + NC Fusion DOS 1/12/22 Cast removed       Wt 74.4 kg (164 lb)   BMI 26.47 kg/m           Yesica Muller ATC    
27.4

## 2022-03-22 ENCOUNTER — OFFICE VISIT (OUTPATIENT)
Dept: ORTHOPEDICS | Facility: CLINIC | Age: 72
End: 2022-03-22
Payer: COMMERCIAL

## 2022-03-22 ENCOUNTER — ANCILLARY PROCEDURE (OUTPATIENT)
Dept: CT IMAGING | Facility: CLINIC | Age: 72
End: 2022-03-22
Attending: ORTHOPAEDIC SURGERY
Payer: COMMERCIAL

## 2022-03-22 DIAGNOSIS — M25.572 PAIN IN JOINT, ANKLE AND FOOT, LEFT: ICD-10-CM

## 2022-03-22 DIAGNOSIS — M79.672 LEFT FOOT PAIN: Primary | ICD-10-CM

## 2022-03-22 PROCEDURE — 99024 POSTOP FOLLOW-UP VISIT: CPT | Performed by: ORTHOPAEDIC SURGERY

## 2022-03-22 PROCEDURE — 73700 CT LOWER EXTREMITY W/O DYE: CPT | Mod: LT | Performed by: RADIOLOGY

## 2022-03-22 RX ORDER — FOLIC ACID 1 MG/1
1 TABLET ORAL DAILY
COMMUNITY
Start: 2021-09-28

## 2022-03-22 RX ORDER — PRAMIPEXOLE DIHYDROCHLORIDE 0.12 MG/1
0.12 TABLET ORAL
COMMUNITY
End: 2023-05-13

## 2022-03-22 RX ORDER — NYSTATIN 100000 U/G
CREAM TOPICAL DAILY PRN
COMMUNITY
Start: 2021-08-13 | End: 2023-10-08

## 2022-03-22 RX ORDER — ATORVASTATIN CALCIUM 80 MG/1
80 TABLET, FILM COATED ORAL DAILY
COMMUNITY

## 2022-03-22 RX ORDER — ONDANSETRON 4 MG/1
4 TABLET, ORALLY DISINTEGRATING ORAL
COMMUNITY
End: 2023-05-13

## 2022-03-22 NOTE — PROGRESS NOTES
CHIEF COMPLAINT:  Status post left talonavicular and naviculocuneiform arthrodesis performed on 01/12/2022.    HISTORY OF PRESENT ILLNESS:  Ms. Mcnamara presents today for further followup.  Reports to be doing well.  Reports to be compliant.    PHYSICAL EXAMINATION:  On today's visit, she presents with well-healed surgical incisions.  There is minimum swelling.  Range of motion of the ankle is from neutral down to 30 degrees of plantar flexion.    IMAGING:  A CT scan has been obtained today, which is significant for showing partial consolidation across the talonavicular and naviculocuneiform joints.  Hardware is intact and in place.  Alignment is excellent.    ASSESSMENT:  Status post left talonavicular and naviculocuneiform arthrodesis.    PLAN:  I discussed with the patient that she is making progress according to the plan.  She is going to proceed with weightbearing as tolerated with use of the CAM Walker.  A short CAM Walker was provided to the patient at today's visit.    A prescription for Physical Therapy was given to the patient to proceed, also, with strengthening, balance, proprioception and range-of-motion exercises.    The patient will follow up in 6 weeks from now, and at that time, 3 views of the left foot will be obtained, and based on those findings, further recommendations will be given to the patient.

## 2022-03-22 NOTE — NURSING NOTE
Reason For Visit:   Chief Complaint   Patient presents with     RECHECK     Left TN and naviculocuneiform arthrodesis DOS 1/12/2022       There were no vitals taken for this visit.         Yesica Muller, ATC

## 2022-03-22 NOTE — LETTER
Date:March 24, 2022      Provider requested that no letter be sent. Do not send.       [facility]

## 2022-03-22 NOTE — LETTER
3/22/2022         RE: Patricia Mcnamara  [address] [place] [address]        Dear Colleague,    Thank you for referring your patient, Patricia Mcnamara, to the [facility] [place]. Please see a copy of my visit note below.    CHIEF COMPLAINT:  Status post left talonavicular and naviculocuneiform arthrodesis performed on 01/12/2022.    HISTORY OF PRESENT ILLNESS:  Ms. Mcnamara presents today for further followup.  Reports to be doing well.  Reports to be compliant.    PHYSICAL EXAMINATION:  On today's visit, she presents with well-healed surgical incisions.  There is minimum swelling.  Range of motion of the ankle is from neutral down to 30 degrees of plantar flexion.    IMAGING:  A CT scan has been obtained today, which is significant for showing partial consolidation across the talonavicular and naviculocuneiform joints.  Hardware is intact and in place.  Alignment is excellent.    ASSESSMENT:  Status post left talonavicular and naviculocuneiform arthrodesis.    PLAN:  I discussed with the patient that she is making progress according to the plan.  She is going to proceed with weightbearing as tolerated with use of the CAM Walker.  A short CAM Walker was provided to the patient at today's visit.    A prescription for Physical Therapy was given to the patient to proceed, also, with strengthening, balance, proprioception and range-of-motion exercises.    The patient will follow up in 6 weeks from now, and at that time, 3 views of the left foot will be obtained, and based on those findings, further recommendations will be given to the patient.          Again, thank you for allowing me to participate in the care of your patient.        Sincerely,        Crow Thomas MD

## 2022-03-28 ENCOUNTER — TELEPHONE (OUTPATIENT)
Dept: ORTHOPEDICS | Facility: CLINIC | Age: 72
End: 2022-03-28
Payer: COMMERCIAL

## 2022-03-28 NOTE — TELEPHONE ENCOUNTER
"I spoke with Patricia today, she had some concerns about the level of pain she has been experiencing since she came out of her cast and went into a CAM boot. Dr. Thomas did tell her to expect some pain, but she feels it's more than she was expecting. She describes the pain as in her heel and shooting up into her calf. We talked about the transition from a cast to a boot and easing into things. I suggested she might just be doing too much too fast, back off some by walking in the boot around the house but maybe using her knee scooter for longer periods such as going out of the house for errands. Also elevating and icing at the end of the day. Her foot may be slightly more dorsiflexed in the boot than it was in the cast, so that could explain the \"stretch\" she feels in her Achilles.   I also discussed that once she starts PT, that will play a big part in pain control also. She will get strength back and they have modalities to assist with pain.  She likes the suggestions/ideas, feels more at ease and is very grateful for my call.  Yesica Muller ATC  "

## 2022-04-04 ENCOUNTER — THERAPY VISIT (OUTPATIENT)
Dept: PHYSICAL THERAPY | Facility: CLINIC | Age: 72
End: 2022-04-04
Attending: ORTHOPAEDIC SURGERY
Payer: COMMERCIAL

## 2022-04-04 DIAGNOSIS — M79.672 LEFT FOOT PAIN: ICD-10-CM

## 2022-04-04 PROCEDURE — 97161 PT EVAL LOW COMPLEX 20 MIN: CPT | Mod: GP | Performed by: PHYSICAL THERAPIST

## 2022-04-04 PROCEDURE — 97110 THERAPEUTIC EXERCISES: CPT | Mod: GP | Performed by: PHYSICAL THERAPIST

## 2022-04-04 NOTE — PROGRESS NOTES
Physical Therapy Initial Evaluation  Subjective:  The history is provided by the patient. No  was used.   Patient Health History  Patricia Mcnamara being seen for Status post left talonavicular and naviculocuneiform arthrodesis .     Problem began: 1/12/2022.   Problem occurred: Unknown   Pain is reported as 8/10 on pain scale.  General health as reported by patient is good.  Pertinent medical history includes: none.   Red flags:  None as reported by patient.  Medical allergies: none.   Surgeries include:  Orthopedic surgery.    Current medications:  Anti-inflammatory, high blood pressure medication and pain medication.    Current occupation is Retired.   Primary job tasks include:  Computer work and repetitive tasks.                  Therapist Generated HPI Evaluation         Type of problem:  Left ankle.    This is a new condition.  Occurance: s/p Status post left talonavicular and naviculocuneiform arthrodesis     Patient reports pain:  Posterior, lower leg, sub calcaneus and medial calcaneal tuberosity.  Pain is described as sharp, stabbing and aching and is constant.  Pain radiates to:  No radiation. Pain is the same all the time.  Since onset symptoms are unchanged.  Associated with: Cam boot on; got out of cast two weeks ago. Symptoms are exacerbated by activity (any movement or weight bearing)  and relieved by ice, bracing/immobilizing and rest.    Previous treatment includes surgery. There was none improvement following previous treatment.  Barriers include:  None as reported by patient.                        Objective:    Gait:  Antalgic, using two canes; patient reports 25% weight bearing before significant pain.    Assisted patient is proper donning/doffing technique of boot as it was too loose/not inflated properly.    Recommended patient using walker for mobility but she declines; she is ordering crutches instead per personal preference                Ankle/Foot Evaluation  ROM:    AROM:  "   Dorsiflexion:  Left:   8  Right:   WNL  Plantarflexion:  Left:  12    Right:  WNL          PROM:    Dorsiflexion: Left:    10     Right:     Plantarflexion: Left:    14     Right:           Pain: Patient reporting \"12/10\" pain with any movement today and at rest    Strength:    Dorsiflexion:  Left: 3-/5     Pain:   Right: 5/5   Pain:  Plantarflexion: Left: 3-/5   Pain:   Right: 5/5  Pain:      Flexion Great Toe:Left: 4/5  Pain:  Right: 5/5   Pain:  Extension Great Toe:Left: 4/5  Pain:  Right: 5/5  Pain:              LIGAMENT TESTING: not assessed              SPECIAL TESTS: normal    PALPATION: Palpation of ankle: very tender at Achilles tendon and entire anterior surface of foot.  Left ankle tenderness present at:  gastroc/soleus; achilles tendon; incisional; medial malleolus and lateral malleolus    EDEMA:   Left ankle edema present at: medial; lateral and anterior                                                              General     ROS    Assessment/Plan:    Patient is a 71 year old female with left side ankle complaints.    Patient has the following significant findings with corresponding treatment plan.                Diagnosis 1:  Status post left talonavicular and naviculocuneiform arthrodesis   Pain -  hot/cold therapy, manual therapy, splint/taping/bracing/orthotics, self management, education and home program  Decreased ROM/flexibility - manual therapy, therapeutic exercise and home program  Decreased joint mobility - manual therapy, therapeutic exercise and home program  Decreased strength - therapeutic exercise, therapeutic activities and home program  Impaired balance - neuro re-education, therapeutic activities and home program  Edema - cryocuff and self management/home program  Impaired gait - gait training and home program  Impaired muscle performance - neuro re-education and home program  Decreased function - therapeutic activities and home program    Therapy Evaluation Codes:   Cumulative " Therapy Evaluation is: Low complexity.    Previous and current functional limitations:  (See Goal Flow Sheet for this information)    Short term and Long term goals: (See Goal Flow Sheet for this information)     Communication ability:  Patient appears to be able to clearly communicate and understand verbal and written communication and follow directions correctly.  Treatment Explanation - The following has been discussed with the patient:   RX ordered/plan of care  Anticipated outcomes  Possible risks and side effects  This patient would benefit from PT intervention to resume normal activities.   Rehab potential is good.    Frequency:  1-2 X week for first 3 weeks then 1x/week, once daily  Duration:  for 12 weeks  Discharge Plan:  Achieve all LTG.  Independent in home treatment program.  Reach maximal therapeutic benefit.    Please refer to the daily flowsheet for treatment today, total treatment time and time spent performing 1:1 timed codes.

## 2022-04-04 NOTE — PROGRESS NOTES
[facility]    OUTPATIENT Physical Therapy ORTHOPEDIC EVALUATION  PLAN OF TREATMENT FOR OUTPATIENT REHABILITATION  (COMPLETE FOR INITIAL CLAIMS ONLY)  Patient's Last Name, First Name, M.I.  YOB: 1950  Patricia Mcnamara    Provider s Name:  [facility]   Medical Record No.  [id number]   Start of Care Date:  04/04/22   Onset Date:   01/12/22   Type:     _X__PT   ___OT Medical Diagnosis:    Encounter Diagnosis   Name Primary?     Left foot pain         Treatment Diagnosis:  Status post left talonavicular and naviculocuneiform arthrodesis         Goals:     04/04/22 0500   Body Part   Goals listed below are for S/p L ankle surgery   Goal #1   Goal #1 ambulation   Previous Functional Level No restrictions   Current Functional Level Minutes patient can walk;with brace;with cane;PWB   Performance Level 8/10 pain with 3 minutes of activity   STG Target Performance Minutes patient will be able to walk   Performance Level 5 minutes with 50% WB status and CAM boot, with cane, PL 5/10   Rationale for safe household ambulation;for safe outdoor household ambulation;for safe community ambulation;for safe work place ambulation;to maintain proper body mechanics/posture while ambulating to avoid additional compensatory injury due to improper gait mechanics;to promote a healthy and active lifestyle   Due Date 05/02/22    LTG Target Performance Minutes patient will be able to  walk   Performance Level 15 minutes with full weight bearing, no boot, no AD   Rationale for safe household ambulation;for safe outdoor household ambulation;for safe community ambulation;to promote a healthy and active lifestyle;to maintain proper body mechanics/posture while ambulating to avoid additional compensatory injury due to improper gait mechanics   Due Date 05/30/22       Therapy Frequency:  2x/week for 3  weeks then 1x/week  Predicted Duration of Therapy Intervention:  12 weeks    Gordon Middleton, PT                 I CERTIFY THE NEED FOR THESE SERVICES FURNISHED UNDER        THIS PLAN OF TREATMENT AND WHILE UNDER MY CARE     (Physician co-signature of this document indicates review and certification of the therapy plan).                       Certification Date From:  04/04/22   Certification Date To:  06/27/22    Referring Provider:  Crow Thomas    Initial Assessment        See Epic Evaluation SOC Date: 04/04/22

## 2022-04-07 ENCOUNTER — THERAPY VISIT (OUTPATIENT)
Dept: PHYSICAL THERAPY | Facility: CLINIC | Age: 72
End: 2022-04-07
Payer: COMMERCIAL

## 2022-04-07 DIAGNOSIS — M79.672 LEFT FOOT PAIN: Primary | ICD-10-CM

## 2022-04-07 PROCEDURE — 97140 MANUAL THERAPY 1/> REGIONS: CPT | Mod: GP | Performed by: PHYSICAL THERAPIST

## 2022-04-07 PROCEDURE — 97110 THERAPEUTIC EXERCISES: CPT | Mod: GP | Performed by: PHYSICAL THERAPIST

## 2022-04-13 ENCOUNTER — THERAPY VISIT (OUTPATIENT)
Dept: PHYSICAL THERAPY | Facility: CLINIC | Age: 72
End: 2022-04-13
Payer: COMMERCIAL

## 2022-04-13 DIAGNOSIS — Z47.89 AFTERCARE FOLLOWING SURGERY OF THE MUSCULOSKELETAL SYSTEM: ICD-10-CM

## 2022-04-13 DIAGNOSIS — M79.672 LEFT FOOT PAIN: Primary | ICD-10-CM

## 2022-04-13 PROCEDURE — 97110 THERAPEUTIC EXERCISES: CPT | Mod: GP | Performed by: PHYSICAL THERAPIST

## 2022-04-13 PROCEDURE — 97530 THERAPEUTIC ACTIVITIES: CPT | Mod: GP | Performed by: PHYSICAL THERAPIST

## 2022-04-21 ENCOUNTER — THERAPY VISIT (OUTPATIENT)
Dept: PHYSICAL THERAPY | Facility: CLINIC | Age: 72
End: 2022-04-21
Payer: COMMERCIAL

## 2022-04-21 DIAGNOSIS — M79.672 LEFT FOOT PAIN: Primary | ICD-10-CM

## 2022-04-21 DIAGNOSIS — Z47.89 AFTERCARE FOLLOWING SURGERY OF THE MUSCULOSKELETAL SYSTEM: ICD-10-CM

## 2022-04-21 PROCEDURE — 97140 MANUAL THERAPY 1/> REGIONS: CPT | Mod: GP | Performed by: PHYSICAL THERAPIST

## 2022-04-21 PROCEDURE — 97530 THERAPEUTIC ACTIVITIES: CPT | Mod: GP | Performed by: PHYSICAL THERAPIST

## 2022-04-21 PROCEDURE — 97110 THERAPEUTIC EXERCISES: CPT | Mod: GP | Performed by: PHYSICAL THERAPIST

## 2022-05-05 DIAGNOSIS — M79.672 LEFT FOOT PAIN: Primary | ICD-10-CM

## 2022-05-10 ENCOUNTER — ANCILLARY PROCEDURE (OUTPATIENT)
Dept: GENERAL RADIOLOGY | Facility: CLINIC | Age: 72
End: 2022-05-10
Attending: ORTHOPAEDIC SURGERY
Payer: COMMERCIAL

## 2022-05-10 ENCOUNTER — OFFICE VISIT (OUTPATIENT)
Dept: ORTHOPEDICS | Facility: CLINIC | Age: 72
End: 2022-05-10
Payer: COMMERCIAL

## 2022-05-10 DIAGNOSIS — M79.672 LEFT FOOT PAIN: ICD-10-CM

## 2022-05-10 DIAGNOSIS — M25.572 PAIN IN JOINT, ANKLE AND FOOT, LEFT: Primary | ICD-10-CM

## 2022-05-10 PROCEDURE — 73630 X-RAY EXAM OF FOOT: CPT | Mod: LT | Performed by: RADIOLOGY

## 2022-05-10 PROCEDURE — 99213 OFFICE O/P EST LOW 20 MIN: CPT | Performed by: ORTHOPAEDIC SURGERY

## 2022-05-10 RX ORDER — LORAZEPAM 0.5 MG/1
0.5 TABLET ORAL
COMMUNITY
Start: 2021-12-28 | End: 2023-05-13

## 2022-05-10 RX ORDER — LISINOPRIL 10 MG/1
10 TABLET ORAL DAILY
COMMUNITY

## 2022-05-10 RX ORDER — SERTRALINE HYDROCHLORIDE 100 MG/1
150 TABLET, FILM COATED ORAL DAILY
COMMUNITY
Start: 2021-09-28

## 2022-05-10 NOTE — NURSING NOTE
Reason For Visit:   Chief Complaint   Patient presents with     RECHECK     Left TC and NC fusion DOS 1/12/22       There were no vitals taken for this visit.         Yesica Muller, ATC

## 2022-05-10 NOTE — LETTER
Date:May 11, 2022      Provider requested that no letter be sent. Do not send.       [facility]

## 2022-05-10 NOTE — PROGRESS NOTES
CHIEF COMPLAINT:  Status post left talonavicular and naviculocuneiform fusion performed on 01/12/2022.    HISTORY OF PRESENT ILLNESS:  Mrs. Mcnamara presents today for further followup.  Reports to be doing well.  Denies to have any pain.    PHYSICAL EXAMINATION:  On today's visit, she presents with excellent alignment of the left foot.  Presented with full range of motion of the ankle joint.  There is minimum swelling.  There are no signs of infection.    IMAGING:  Three views of the left foot were obtained today, which are significant for showing full consolidation across the arthrodesis site.  Hardware is intact and in place.  Alignment is excellent.    ASSESSMENT:  Status post left naviculocuneiform and talonavicular joint fusion.    PLAN:  I discussed with the patient to proceed with physical therapy for gait training.  She has no restrictions.  She will follow up on a p.r.n. basis or in 3 months from now if she is not happy with the function of her ankle or foot area.    All questions were answered.  The patient was pleased with the discussion.    TT:  20 minutes.  CT:  15 minutes.

## 2022-05-10 NOTE — LETTER
5/10/2022         RE: Patricia Mcnamara  [address] [place] [address]        Dear Colleague,    Thank you for referring your patient, Patricia Mcnamara, to the [facility] [place]. Please see a copy of my visit note below.    CHIEF COMPLAINT:  Status post left talonavicular and naviculocuneiform fusion performed on 01/12/2022.    HISTORY OF PRESENT ILLNESS:  Mrs. Mcnamara presents today for further followup.  Reports to be doing well.  Denies to have any pain.    PHYSICAL EXAMINATION:  On today's visit, she presents with excellent alignment of the left foot.  Presented with full range of motion of the ankle joint.  There is minimum swelling.  There are no signs of infection.    IMAGING:  Three views of the left foot were obtained today, which are significant for showing full consolidation across the arthrodesis site.  Hardware is intact and in place.  Alignment is excellent.    ASSESSMENT:  Status post left naviculocuneiform and talonavicular joint fusion.    PLAN:  I discussed with the patient to proceed with physical therapy for gait training.  She has no restrictions.  She will follow up on a p.r.n. basis or in 3 months from now if she is not happy with the function of her ankle or foot area.    All questions were answered.  The patient was pleased with the discussion.    TT:  20 minutes.  CT:  15 minutes.          Again, thank you for allowing me to participate in the care of your patient.        Sincerely,        Crow Thomas MD

## 2022-05-11 ENCOUNTER — THERAPY VISIT (OUTPATIENT)
Dept: PHYSICAL THERAPY | Facility: CLINIC | Age: 72
End: 2022-05-11
Payer: COMMERCIAL

## 2022-05-11 DIAGNOSIS — Z47.89 AFTERCARE FOLLOWING SURGERY OF THE MUSCULOSKELETAL SYSTEM: ICD-10-CM

## 2022-05-11 DIAGNOSIS — M79.672 LEFT FOOT PAIN: Primary | ICD-10-CM

## 2022-05-11 PROCEDURE — 97530 THERAPEUTIC ACTIVITIES: CPT | Mod: GP | Performed by: PHYSICAL THERAPIST

## 2022-05-11 PROCEDURE — 97140 MANUAL THERAPY 1/> REGIONS: CPT | Mod: GP | Performed by: PHYSICAL THERAPIST

## 2022-05-11 PROCEDURE — 97110 THERAPEUTIC EXERCISES: CPT | Mod: GP | Performed by: PHYSICAL THERAPIST

## 2022-05-26 ENCOUNTER — THERAPY VISIT (OUTPATIENT)
Dept: PHYSICAL THERAPY | Facility: CLINIC | Age: 72
End: 2022-05-26
Payer: COMMERCIAL

## 2022-05-26 DIAGNOSIS — Z47.89 AFTERCARE FOLLOWING SURGERY OF THE MUSCULOSKELETAL SYSTEM: ICD-10-CM

## 2022-05-26 DIAGNOSIS — M79.672 LEFT FOOT PAIN: Primary | ICD-10-CM

## 2022-05-26 DIAGNOSIS — M25.572 PAIN IN JOINT, ANKLE AND FOOT, LEFT: ICD-10-CM

## 2022-05-26 PROCEDURE — 97110 THERAPEUTIC EXERCISES: CPT | Mod: GP | Performed by: PHYSICAL THERAPIST

## 2022-05-26 PROCEDURE — 97140 MANUAL THERAPY 1/> REGIONS: CPT | Mod: GP | Performed by: PHYSICAL THERAPIST

## 2022-06-02 ENCOUNTER — THERAPY VISIT (OUTPATIENT)
Dept: PHYSICAL THERAPY | Facility: CLINIC | Age: 72
End: 2022-06-02
Payer: COMMERCIAL

## 2022-06-02 DIAGNOSIS — M79.672 LEFT FOOT PAIN: Primary | ICD-10-CM

## 2022-06-02 DIAGNOSIS — Z47.89 AFTERCARE FOLLOWING SURGERY OF THE MUSCULOSKELETAL SYSTEM: ICD-10-CM

## 2022-06-02 PROCEDURE — 97110 THERAPEUTIC EXERCISES: CPT | Mod: GP | Performed by: PHYSICAL THERAPIST

## 2022-06-02 PROCEDURE — 97140 MANUAL THERAPY 1/> REGIONS: CPT | Mod: GP | Performed by: PHYSICAL THERAPIST

## 2022-06-02 PROCEDURE — 97112 NEUROMUSCULAR REEDUCATION: CPT | Mod: GP | Performed by: PHYSICAL THERAPIST

## 2022-06-16 ENCOUNTER — THERAPY VISIT (OUTPATIENT)
Dept: PHYSICAL THERAPY | Facility: CLINIC | Age: 72
End: 2022-06-16
Payer: COMMERCIAL

## 2022-06-16 DIAGNOSIS — M79.672 LEFT FOOT PAIN: Primary | ICD-10-CM

## 2022-06-16 DIAGNOSIS — Z47.89 AFTERCARE FOLLOWING SURGERY OF THE MUSCULOSKELETAL SYSTEM: ICD-10-CM

## 2022-06-16 PROCEDURE — 97530 THERAPEUTIC ACTIVITIES: CPT | Mod: GP | Performed by: PHYSICAL THERAPIST

## 2022-06-16 PROCEDURE — 97110 THERAPEUTIC EXERCISES: CPT | Mod: GP | Performed by: PHYSICAL THERAPIST

## 2022-06-16 PROCEDURE — 97140 MANUAL THERAPY 1/> REGIONS: CPT | Mod: GP | Performed by: PHYSICAL THERAPIST

## 2022-06-17 NOTE — PROGRESS NOTES
PROGRESS  REPORT    Progress reporting period is from 4-4-2022 to 6-.  Pt has been seen 8x in PT.       SUBJECTIVE  Subjective changes noted by patient:  Pain has been getting gradually worse over the past 3-4 weeks.  Pain is worse than it was at the last visit. has mild-mod pain w/ stepping on L foot - medial foot, and mod-severe pain w/ push off -Ant TC area.    Current Pain level: 9/10 (ranges 5-9/10).     Initial Pain level: 10/10.   Changes in function:  None, Amb is getting more painful  Adverse reaction to treatment or activity: Pain is slowly increasing, pt reports she is WB <50% of the day, Is using a SEC at home but not when she leaves the house, and is making herself do the exer even though she is having pain.    OBJECTIVE  To PT w/o AD or boot.    Amb w/  deviation of min-mod ER L leg, Mod decr heel strike and push off, and Min-mod Decr WB L/Wt shift L.    L Ankle AROM:  DF 5, PF 40.    Mild pitting edema L foot.    No TTP Post Tib Tendon and Peroneal tendons  No tenderness to palpation L medial foot or ant TC Jt      ASSESSMENT/PLAN  Updated problem list and treatment plan:   Diagnosis 1:  Status post left talonavicular and naviculocuneiform arthrodesis   Pain -  hot/cold therapy, manual therapy, self management, education, directional preference exercise and home program  Decreased ROM/flexibility - manual therapy, therapeutic exercise and home program  Decreased joint mobility - manual therapy, therapeutic exercise and home program  Decreased strength - therapeutic exercise, therapeutic activities and home program  Impaired balance - neuro re-education, therapeutic activities and home program  Decreased proprioception - neuro re-education, therapeutic activities and home program  Impaired gait - gait training and home program  Decreased function - therapeutic activities and home program  STG/LTGs have been met or progress has been made towards goals:  Yes (See Goal flow sheet completed  today.)  Assessment of Progress: The patient's condition has exacerbated.  Self Management Plans:  Patient has been instructed in a home treatment program.  Patient  has been instructed in self management of symptoms.  Patricia continues to require the following intervention to meet STG and LTG's:  PT    Recommendations:  This patient would benefit from continued therapy.     Frequency:  1 X week, once daily  Duration:  for 2 weeks Then reassess.  Pt has been instr to return to wearing boot x4-7 days, decr walking and standing activities, ice and elevate and do AROM/gentle NWB stretching until next appt.

## 2022-07-05 ENCOUNTER — TELEPHONE (OUTPATIENT)
Dept: ORTHOPEDICS | Facility: CLINIC | Age: 72
End: 2022-07-05

## 2022-07-05 DIAGNOSIS — M25.572 PAIN IN JOINT, ANKLE AND FOOT, LEFT: Primary | ICD-10-CM

## 2022-07-05 NOTE — TELEPHONE ENCOUNTER
RN called pt regarding swelling of L foot. Swelling has happened sometime after clinic visit in May c Dr. Thomas. Pt has been attending PT but with limited success due to swelling. Pt state that there is pitting edema when PT handles foot. PT did not offer any advice. RN asked pt to get compression stocking for foot, pt verbalized understanding and agreed to plan. Pt will also be seen by Dr. Thomas next week on 7/12. Pt denies redness, warmth, does endorse pain, but well managed. Pt endorses elevating and icing.     Austin Bean, CALIXTOCC

## 2022-07-13 ENCOUNTER — THERAPY VISIT (OUTPATIENT)
Dept: PHYSICAL THERAPY | Facility: CLINIC | Age: 72
End: 2022-07-13
Payer: COMMERCIAL

## 2022-07-13 DIAGNOSIS — M79.672 LEFT FOOT PAIN: Primary | ICD-10-CM

## 2022-07-13 DIAGNOSIS — Z47.89 AFTERCARE FOLLOWING SURGERY OF THE MUSCULOSKELETAL SYSTEM: ICD-10-CM

## 2022-07-13 PROCEDURE — 97530 THERAPEUTIC ACTIVITIES: CPT | Mod: GP | Performed by: PHYSICAL THERAPIST

## 2022-07-13 PROCEDURE — 97110 THERAPEUTIC EXERCISES: CPT | Mod: GP | Performed by: PHYSICAL THERAPIST

## 2022-07-26 ENCOUNTER — OFFICE VISIT (OUTPATIENT)
Dept: ORTHOPEDICS | Facility: CLINIC | Age: 72
End: 2022-07-26
Payer: COMMERCIAL

## 2022-07-26 VITALS — BODY MASS INDEX: 26.36 KG/M2 | WEIGHT: 164 LBS | HEIGHT: 66 IN

## 2022-07-26 DIAGNOSIS — M25.572 PAIN IN JOINT, ANKLE AND FOOT, LEFT: Primary | ICD-10-CM

## 2022-07-26 PROCEDURE — 99213 OFFICE O/P EST LOW 20 MIN: CPT | Performed by: ORTHOPAEDIC SURGERY

## 2022-07-26 NOTE — LETTER
7/26/2022         RE: Patricia Mcnamara  [address] [place] [address]        Dear Colleague,    Thank you for referring your patient, Patricia Mcnamara, to the [facility] [place]. Please see a copy of my visit note below.    CHIEF COMPLAINT:  Status post left talonavicular and naviculocuneiform fusion performed on 01/12/2022.    HISTORY OF PRESENT ILLNESS:  Ms. Mcnamara presents today for further followup.  Reports to be doing well.  Reports to continue working on Physical Therapy.  Apparently, her physical therapist has encouraged her to visit with us as she has a concern that the foot may not be completely healed.    The patient reports to be much improved with the use of compression stockings and reports also to be very sensitive to weather changes.    PHYSICAL EXAMINATION:  On today's exam, she presents with a very unremarkable foot.  There is full range of motion of the ankle.  Alignment is excellent.  There is some diffuse swelling.    ASSESSMENT:    1.  Status post left talonavicular and naviculocuneiform arthrodesis.  2.  Residual left foot edema.    PLAN:  Discussed with the patient that at this point, I would like to proceed with a CT scan to confirm that everything is coming along and to reassure the feelings of the therapist.    In the meantime, she has no activity restrictions.  All questions were answered.  The patient will be contacted via phone once the CT scan results are available for review.    TT:  20 minutes.  CT:  15 minutes.          Again, thank you for allowing me to participate in the care of your patient.        Sincerely,        Crow Thomas MD

## 2022-07-26 NOTE — PROGRESS NOTES
CHIEF COMPLAINT:  Status post left talonavicular and naviculocuneiform fusion performed on 01/12/2022.    HISTORY OF PRESENT ILLNESS:  Ms. Mcnamara presents today for further followup.  Reports to be doing well.  Reports to continue working on Physical Therapy.  Apparently, her physical therapist has encouraged her to visit with us as she has a concern that the foot may not be completely healed.    The patient reports to be much improved with the use of compression stockings and reports also to be very sensitive to weather changes.    PHYSICAL EXAMINATION:  On today's exam, she presents with a very unremarkable foot.  There is full range of motion of the ankle.  Alignment is excellent.  There is some diffuse swelling.    ASSESSMENT:    1.  Status post left talonavicular and naviculocuneiform arthrodesis.  2.  Residual left foot edema.    PLAN:  Discussed with the patient that at this point, I would like to proceed with a CT scan to confirm that everything is coming along and to reassure the feelings of the therapist.    In the meantime, she has no activity restrictions.  All questions were answered.  The patient will be contacted via phone once the CT scan results are available for review.    TT:  20 minutes.  CT:  15 minutes.

## 2022-07-26 NOTE — NURSING NOTE
"Reason For Visit:   Chief Complaint   Patient presents with     RECHECK     Left Foot Edema s/p L TN &NC fusion // patient states that her PT requested that she be evaluated before continuing with therapy. Edema has been present since she came out of the cam boot        Ht 1.676 m (5' 6\")   Wt 74.4 kg (164 lb)   BMI 26.47 kg/m      Pain Assessment  Patient Currently in Pain: No  0-10 Pain Scale: 8 (patient states that the weather effects her pain.)    Ken Harrison, EMT    "

## 2022-07-26 NOTE — LETTER
Date:July 27, 2022      Patient was self referred, no letter generated. Do not send.        [facility] Information

## 2022-07-27 ENCOUNTER — THERAPY VISIT (OUTPATIENT)
Dept: PHYSICAL THERAPY | Facility: CLINIC | Age: 72
End: 2022-07-27
Payer: COMMERCIAL

## 2022-07-27 DIAGNOSIS — Z47.89 AFTERCARE FOLLOWING SURGERY OF THE MUSCULOSKELETAL SYSTEM: ICD-10-CM

## 2022-07-27 DIAGNOSIS — M79.672 LEFT FOOT PAIN: Primary | ICD-10-CM

## 2022-07-27 PROCEDURE — 97530 THERAPEUTIC ACTIVITIES: CPT | Mod: GP | Performed by: PHYSICAL THERAPIST

## 2022-07-27 PROCEDURE — 97110 THERAPEUTIC EXERCISES: CPT | Mod: GP | Performed by: PHYSICAL THERAPIST

## 2022-07-28 ENCOUNTER — ANCILLARY PROCEDURE (OUTPATIENT)
Dept: CT IMAGING | Facility: CLINIC | Age: 72
End: 2022-07-28
Attending: ORTHOPAEDIC SURGERY
Payer: COMMERCIAL

## 2022-07-28 DIAGNOSIS — M25.572 PAIN IN JOINT, ANKLE AND FOOT, LEFT: ICD-10-CM

## 2022-07-28 PROCEDURE — 73700 CT LOWER EXTREMITY W/O DYE: CPT | Mod: LT | Performed by: RADIOLOGY

## 2022-08-02 ENCOUNTER — VIRTUAL VISIT (OUTPATIENT)
Dept: ORTHOPEDICS | Facility: CLINIC | Age: 72
End: 2022-08-02
Payer: COMMERCIAL

## 2022-08-02 DIAGNOSIS — M25.572 PAIN IN JOINT, ANKLE AND FOOT, LEFT: Primary | ICD-10-CM

## 2022-08-02 PROCEDURE — 99213 OFFICE O/P EST LOW 20 MIN: CPT | Mod: 95 | Performed by: ORTHOPAEDIC SURGERY

## 2022-08-02 NOTE — LETTER
Date:August 3, 2022      Patient was self referred, no letter generated. Do not send.        [facility] Health Information

## 2022-08-02 NOTE — LETTER
8/2/2022         RE: Patricia Mcnamara  [address] [place] [address]        Dear Colleague,    Thank you for referring your patient, Patricia Mcnamara, to the [facility] [place]. Please see a copy of my visit note below.    TELEPHONE VISIT    CHIEF COMPLAINT:  Status post left talonavicular and naviculocuneiform arthrodesis performed on 01/12/2022.    HISTORY OF PRESENT ILLNESS:  Ms. Mcnamara was interviewed via phone secondary to the pandemic.  The patient authorized the encounter.    We discussed the CT scan findings of her foot, which are significant for showing what seems to be a solid fusion across the naviculocuneiform joint with some partial fusion across the talonavicular joint.    We also talked about the importance of improving her edema through compression stockings.  The patient came to the agreement that if she is not improving her edema after 2 months of daily use of compression stockings we will provide her with a referral to the lymphedema clinic.    The patient has no restrictions.  She will follow up on a p.r.n. basis.  All questions were answered.    TT:  20 minutes.  CT:  15 minutes.          Again, thank you for allowing me to participate in the care of your patient.        Sincerely,        Crow Thomas MD

## 2022-08-02 NOTE — NURSING NOTE
Reason For Visit:   Chief Complaint   Patient presents with     RECHECK     Review CT results - Left Foot        There were no vitals taken for this visit.         Ken Harrison, EMT

## 2022-08-02 NOTE — PROGRESS NOTES
TELEPHONE VISIT    CHIEF COMPLAINT:  Status post left talonavicular and naviculocuneiform arthrodesis performed on 01/12/2022.    HISTORY OF PRESENT ILLNESS:  Ms. Mcnamara was interviewed via phone secondary to the pandemic.  The patient authorized the encounter.    We discussed the CT scan findings of her foot, which are significant for showing what seems to be a solid fusion across the naviculocuneiform joint with some partial fusion across the talonavicular joint.    We also talked about the importance of improving her edema through compression stockings.  The patient came to the agreement that if she is not improving her edema after 2 months of daily use of compression stockings we will provide her with a referral to the lymphedema clinic.    The patient has no restrictions.  She will follow up on a p.r.n. basis.  All questions were answered.    TT:  20 minutes.  CT:  15 minutes.

## 2022-08-17 ENCOUNTER — THERAPY VISIT (OUTPATIENT)
Dept: PHYSICAL THERAPY | Facility: CLINIC | Age: 72
End: 2022-08-17
Payer: COMMERCIAL

## 2022-08-17 DIAGNOSIS — Z47.89 AFTERCARE FOLLOWING SURGERY OF THE MUSCULOSKELETAL SYSTEM: ICD-10-CM

## 2022-08-17 DIAGNOSIS — M79.672 LEFT FOOT PAIN: Primary | ICD-10-CM

## 2022-08-17 PROCEDURE — 97530 THERAPEUTIC ACTIVITIES: CPT | Mod: GP | Performed by: PHYSICAL THERAPIST

## 2022-08-17 PROCEDURE — 97110 THERAPEUTIC EXERCISES: CPT | Mod: 59 | Performed by: PHYSICAL THERAPIST

## 2022-08-28 NOTE — PROGRESS NOTES
[facility]    OUTPATIENT Physical Therapy ORTHOPEDIC EVALUATION  PLAN OF TREATMENT FOR OUTPATIENT REHABILITATION  (COMPLETE FOR INITIAL CLAIMS ONLY)  Patient's Last Name, First Name, M.I.  YOB: 1950  Patricia Mcnamara    Provider s Name:  [facility]   Medical Record No.  [id number]   Start of Care Date:  04/04/22   Onset Date:   01/12/22   Type:     _X__PT   ___OT Medical Diagnosis:    Encounter Diagnoses   Name Primary?    Left foot pain Yes    Aftercare following surgery of the musculoskeletal system         Treatment Diagnosis:  Status post left talonavicular and naviculocuneiform arthrodesis         Goals:     07/13/22 0500   Body Part   Goals listed below are for S/p L ankle surgery   Goal #1   Goal #1 ambulation   Previous Functional Level No restrictions   Current Functional Level Minutes patient can walk   Performance Level w/o AD or boot - 15 min w/ mod deviation of Decr heel strike/push off, decr Wt Shift,and pain 8/10  (L foot pain remains very high and deviations continue.)   STG Target Performance Minutes patient will be able to walk   Performance Level 15 min, pain 5/10 or less   Rationale for safe household ambulation;for safe outdoor household ambulation;for safe community ambulation;for safe work place ambulation;to maintain proper body mechanics/posture while ambulating to avoid additional compensatory injury due to improper gait mechanics;to promote a healthy and active lifestyle   Due Date 08/10/22   If goal not met, Why? Goal is not met  Reset goal    LTG Target Performance Minutes patient will be able to  walk   Performance Level 15 minutes w/ pain 2/10 or less   Rationale for safe household ambulation;for safe outdoor household ambulation;for safe community ambulation;to promote a healthy and active lifestyle;to maintain proper body  mechanics/posture while ambulating to avoid additional compensatory injury due to improper gait mechanics   Due Date 09/07/22   If goal not met, Why? Goal is no longer met.  Extend goal.       Therapy Frequency:  2x/week for 3 weeks then 1x/week  Predicted Duration of Therapy Intervention:  12 weeks    Alma Tiwari, PT                 I CERTIFY THE NEED FOR THESE SERVICES FURNISHED UNDER        THIS PLAN OF TREATMENT AND WHILE UNDER MY CARE     (Physician attestation of this document indicates review and certification of the therapy plan).                     Certification Date From:  04/04/22   Certification Date To:  06/27/22    Referring Provider:  Crow Thomas MD    Initial Assessment        See Epic Evaluation SOC Date: 04/04/22

## 2022-08-28 NOTE — PROGRESS NOTES
PROGRESS  REPORT    Progress reporting period is from 4-4-2022 to 7-.  Pt has been seen  9x.    SUBJECTIVE  Subjective changes noted by patient:  Has been wearing a compression sock since last Thursday - swelling seems to be better, no change in pain in foot/ankle.  sitting:stdg/walking  60:40.  is ascending stairs normall and descending w/ mod difficulty.  Has not been doing exer.    Current Pain level: 8/10 (ranges 3-9/10).     Initial Pain level: 10/10.   Changes in function:  Minimal change in function  Adverse reaction to treatment or activity: None    OBJECTIVE  To PT w/ SEC - L leg ER, decr heelstrike and pushoff.    L Ankle AROM:  DF 5, PF 45.    mild pitting edema L foot (decr edema noted in L ankle.   TTP L Peroneal tendon at lat malleoli, no TTP Post Tib Tendon.    ASSESSMENT/PLAN  Updated problem list and treatment plan:   Diagnosis 1:  Status post left talonavicular and naviculocuneiform arthrodesis   Pain -  hot/cold therapy, manual therapy, self management, education, directional preference exercise and home program  Decreased ROM/flexibility - manual therapy, therapeutic exercise and home program  Decreased strength - therapeutic exercise, therapeutic activities and home program  Impaired balance - neuro re-education, therapeutic activities and home program  Decreased proprioception - neuro re-education, therapeutic activities and home program  Edema - self management/home program and compression sock  Impaired gait - gait training, assistive devices and home program  Decreased function - therapeutic activities and home program  STG/LTGs have been met or progress has been made towards goals:  Minimal progress seen  Assessment of Progress: The patient's condition has potential to improve.  Self Management Plans:  Patient has been instructed in a home treatment program.  Patient  has been instructed in self management of symptoms.  I have re-evaluated this patient and find that the nature,  scope, duration and intensity of the therapy is appropriate for the medical condition of the patient.  Patricia continues to require the following intervention to meet STG and LTG's:  PT    Recommendations:  This patient would benefit from continued therapy.     Frequency:  1 X week, once daily  Duration:  for 8 weeks

## 2022-08-29 ENCOUNTER — THERAPY VISIT (OUTPATIENT)
Dept: PHYSICAL THERAPY | Facility: CLINIC | Age: 72
End: 2022-08-29
Payer: COMMERCIAL

## 2022-08-29 DIAGNOSIS — Z47.89 AFTERCARE FOLLOWING SURGERY OF THE MUSCULOSKELETAL SYSTEM: ICD-10-CM

## 2022-08-29 DIAGNOSIS — M79.672 LEFT FOOT PAIN: Primary | ICD-10-CM

## 2022-08-29 PROCEDURE — 97110 THERAPEUTIC EXERCISES: CPT | Mod: GP | Performed by: PHYSICAL THERAPIST

## 2022-08-29 PROCEDURE — 97112 NEUROMUSCULAR REEDUCATION: CPT | Mod: GP | Performed by: PHYSICAL THERAPIST

## 2022-08-29 NOTE — PROGRESS NOTES
"PROGRESS  REPORT    Progress reporting period is from 7- to 8-.  Pt has been seen 12 times in PT.       SUBJECTIVE  Subjective changes noted by patient:  Reports the foot is better in the morning but progressively more painful as day progresses.  Is doing 40-45 min Stdg/walking then sits and elevates 10-20 min. Is not using ice anymore.  Continues to wear compression sock and use SEC.  Reports she has not been good at doing HEP.  Remains very frustrated at level of pain she is having.    Current Pain level: 5/10 (ranges 5-7/10).     Initial Pain level: 10/10.   Changes in function:  Yes (See Goal flowsheet attached for changes in current functional level)  Adverse reaction to treatment or activity: activity - Incr pain as day progresses and the longer she is on her feet.    OBJECTIVE  To PT w/o AD, L foot externally rotated and decr toe off L.    L ankle AROM:  DF 10, PF 32,  Inver 10, Ever 10.   SLS EO R 4-5\", L 2-3\"  Incr pain L ankle w/ SLS.   Tandem walk w/ 1 hand assist.    Sidestepping w/o assist.      ASSESSMENT/PLAN  Updated problem list and treatment plan:   Diagnosis 1:  Status post left talonavicular and naviculocuneiform arthrodesis   Pain -  hot/cold therapy, manual therapy, self management, education, directional preference exercise and home program  Decreased ROM/flexibility - manual therapy, therapeutic exercise and home program  Decreased strength - therapeutic exercise, therapeutic activities and home program  Impaired balance - neuro re-education, therapeutic activities and home program  Decreased proprioception - neuro re-education, therapeutic activities and home program  Impaired gait - gait training and assistive devices  Decreased function - therapeutic activities and home program  STG/LTGs have been met or progress has been made towards goals:  Yes (See Goal flow sheet completed today.)  Assessment of Progress: The patient's progress has slowed.  Self Management Plans:  Patient " has been instructed in a home treatment program.  Patient  has been instructed in self management of symptoms.  I have re-evaluated this patient and find that the nature, scope, duration and intensity of the therapy is appropriate for the medical condition of the patient.  Patricia continues to require the following intervention to meet STG and LTG's:  PT    Recommendations:  This patient would benefit from continued therapy.     Frequency:  2 X a month, once daily  Duration:  for 8 weeks

## 2022-08-29 NOTE — PROGRESS NOTES
[facility]    OUTPATIENT Physical Therapy ORTHOPEDIC EVALUATION  PLAN OF TREATMENT FOR OUTPATIENT REHABILITATION  (COMPLETE FOR INITIAL CLAIMS ONLY)  Patient's Last Name, First Name, M.I.  YOB: 1950  Patricia Mcnamara    Provider s Name:  [facility]   Medical Record No.  [id number]   Start of Care Date:  04/04/22   Onset Date:   01/12/22   Type:     _X__PT   ___OT Medical Diagnosis:    Encounter Diagnoses   Name Primary?    Left foot pain Yes    Aftercare following surgery of the musculoskeletal system         Treatment Diagnosis:  Status post left talonavicular and naviculocuneiform arthrodesis         Goals:     08/29/22 0500   Body Part   Goals listed below are for S/p L ankle surgery   Goal #1   Goal #1 ambulation   Previous Functional Level No restrictions   Current Functional Level Minutes patient can walk   Performance Level 15 min w/ cane w/ pain 5/10, 30-40 min, pain 5-7/10   STG Target Performance Minutes patient will be able to walk;with cane   Performance Level 15 min, pain 5/10 or less   Rationale for safe household ambulation;for safe outdoor household ambulation;for safe community ambulation;for safe work place ambulation;to maintain proper body mechanics/posture while ambulating to avoid additional compensatory injury due to improper gait mechanics;to promote a healthy and active lifestyle   Due Date 08/10/22   Date Goal Met 07/27/22    LTG Target Performance Minutes patient will be able to  walk   Performance Level 30-40 minutes w/ pain 5/10 or less   Rationale for safe household ambulation;for safe outdoor household ambulation;for safe community ambulation;to promote a healthy and active lifestyle;to maintain proper body mechanics/posture while ambulating to avoid additional compensatory injury due to improper gait mechanics   Due Date 09/07/22   If  goal not met, Why? Level of pain is not changing, but pt is on feet for 30-40 min at a time.       Therapy Frequency:  2 x/month  Predicted Duration of Therapy Intervention:  8 weeks    Alma Tiwari, PT                 I CERTIFY THE NEED FOR THESE SERVICES FURNISHED UNDER        THIS PLAN OF TREATMENT AND WHILE UNDER MY CARE     (Physician attestation of this document indicates review and certification of the therapy plan).                     Certification Date From:  08/27/22   Certification Date To:  10/24/22    Referring Provider:  Crow Thomas    Initial Assessment        See Epic Evaluation SOC Date: 04/04/22

## 2022-09-07 ENCOUNTER — THERAPY VISIT (OUTPATIENT)
Dept: PHYSICAL THERAPY | Facility: CLINIC | Age: 72
End: 2022-09-07
Payer: COMMERCIAL

## 2022-09-07 DIAGNOSIS — Z47.89 AFTERCARE FOLLOWING SURGERY OF THE MUSCULOSKELETAL SYSTEM: ICD-10-CM

## 2022-09-07 DIAGNOSIS — M79.672 LEFT FOOT PAIN: Primary | ICD-10-CM

## 2022-09-07 PROCEDURE — 97112 NEUROMUSCULAR REEDUCATION: CPT | Mod: GP | Performed by: PHYSICAL THERAPIST

## 2022-09-07 PROCEDURE — 97530 THERAPEUTIC ACTIVITIES: CPT | Mod: GP | Performed by: PHYSICAL THERAPIST

## 2022-09-07 PROCEDURE — 97110 THERAPEUTIC EXERCISES: CPT | Mod: GP | Performed by: PHYSICAL THERAPIST

## 2022-10-09 ENCOUNTER — HEALTH MAINTENANCE LETTER (OUTPATIENT)
Age: 72
End: 2022-10-09

## 2022-11-26 ENCOUNTER — HEALTH MAINTENANCE LETTER (OUTPATIENT)
Age: 72
End: 2022-11-26

## 2023-02-18 ENCOUNTER — HEALTH MAINTENANCE LETTER (OUTPATIENT)
Age: 73
End: 2023-02-18

## 2023-05-13 ENCOUNTER — HOSPITAL ENCOUNTER (INPATIENT)
Facility: HOSPITAL | Age: 73
LOS: 1 days | Discharge: HOME | DRG: 193 | End: 2023-05-15
Attending: EMERGENCY MEDICINE | Admitting: INTERNAL MEDICINE
Payer: COMMERCIAL

## 2023-05-13 ENCOUNTER — TRANSFERRED RECORDS (OUTPATIENT)
Dept: HEALTH INFORMATION MANAGEMENT | Facility: CLINIC | Age: 73
End: 2023-05-13

## 2023-05-13 ENCOUNTER — APPOINTMENT (OUTPATIENT)
Dept: CT IMAGING | Facility: HOSPITAL | Age: 73
DRG: 193 | End: 2023-05-13
Attending: EMERGENCY MEDICINE
Payer: COMMERCIAL

## 2023-05-13 DIAGNOSIS — J44.9 CHRONIC OBSTRUCTIVE PULMONARY DISEASE, UNSPECIFIED COPD TYPE (H): ICD-10-CM

## 2023-05-13 DIAGNOSIS — J96.01 ACUTE RESPIRATORY FAILURE WITH HYPOXIA (H): ICD-10-CM

## 2023-05-13 DIAGNOSIS — E83.52 HYPERCALCEMIA: ICD-10-CM

## 2023-05-13 DIAGNOSIS — J44.1 COPD EXACERBATION (H): Primary | ICD-10-CM

## 2023-05-13 DIAGNOSIS — J18.9 PNEUMONIA DUE TO INFECTIOUS ORGANISM, UNSPECIFIED LATERALITY, UNSPECIFIED PART OF LUNG: ICD-10-CM

## 2023-05-13 LAB
ALBUMIN SERPL BCG-MCNC: 3.8 G/DL (ref 3.5–5.2)
ALP SERPL-CCNC: 117 U/L (ref 35–104)
ALT SERPL W P-5'-P-CCNC: 33 U/L (ref 10–35)
ANION GAP SERPL CALCULATED.3IONS-SCNC: 7 MMOL/L (ref 7–15)
AST SERPL W P-5'-P-CCNC: 40 U/L (ref 10–35)
BASE EXCESS BLDV CALC-SCNC: 4.6 MMOL/L
BASOPHILS # BLD AUTO: 0 10E3/UL (ref 0–0.2)
BASOPHILS NFR BLD AUTO: 0 %
BILIRUB SERPL-MCNC: 0.3 MG/DL
BUN SERPL-MCNC: 9.8 MG/DL (ref 8–23)
CALCIUM SERPL-MCNC: 10.4 MG/DL (ref 8.8–10.2)
CHLORIDE SERPL-SCNC: 102 MMOL/L (ref 98–107)
CREAT SERPL-MCNC: 0.51 MG/DL (ref 0.51–0.95)
DEPRECATED HCO3 PLAS-SCNC: 27 MMOL/L (ref 22–29)
EOSINOPHIL # BLD AUTO: 0.1 10E3/UL (ref 0–0.7)
EOSINOPHIL NFR BLD AUTO: 0 %
ERYTHROCYTE [DISTWIDTH] IN BLOOD BY AUTOMATED COUNT: 15.6 % (ref 10–15)
GFR SERPL CREATININE-BSD FRML MDRD: >90 ML/MIN/1.73M2
GLUCOSE SERPL-MCNC: 109 MG/DL (ref 70–99)
HCO3 BLDV-SCNC: 30 MMOL/L (ref 24–30)
HCT VFR BLD AUTO: 44.4 % (ref 35–47)
HGB BLD-MCNC: 14.4 G/DL (ref 11.7–15.7)
HOLD SPECIMEN: NORMAL
IMM GRANULOCYTES # BLD: 0 10E3/UL
IMM GRANULOCYTES NFR BLD: 0 %
LACTATE SERPL-SCNC: 0.9 MMOL/L (ref 0.7–2)
LYMPHOCYTES # BLD AUTO: 2.4 10E3/UL (ref 0.8–5.3)
LYMPHOCYTES NFR BLD AUTO: 21 %
MCH RBC QN AUTO: 29.1 PG (ref 26.5–33)
MCHC RBC AUTO-ENTMCNC: 32.4 G/DL (ref 31.5–36.5)
MCV RBC AUTO: 90 FL (ref 78–100)
MONOCYTES # BLD AUTO: 0.8 10E3/UL (ref 0–1.3)
MONOCYTES NFR BLD AUTO: 7 %
NEUTROPHILS # BLD AUTO: 8.1 10E3/UL (ref 1.6–8.3)
NEUTROPHILS NFR BLD AUTO: 72 %
NRBC # BLD AUTO: 0 10E3/UL
NRBC BLD AUTO-RTO: 0 /100
NT-PROBNP SERPL-MCNC: 831 PG/ML (ref 0–900)
OXYHGB MFR BLDV: 74.2 % (ref 70–75)
PCO2 BLDV: 51 MM HG (ref 35–50)
PH BLDV: 7.37 [PH] (ref 7.35–7.45)
PLATELET # BLD AUTO: 160 10E3/UL (ref 150–450)
PO2 BLDV: 40 MM HG (ref 25–47)
POTASSIUM SERPL-SCNC: 3.9 MMOL/L (ref 3.4–5.3)
PROT SERPL-MCNC: 7.2 G/DL (ref 6.4–8.3)
RBC # BLD AUTO: 4.95 10E6/UL (ref 3.8–5.2)
SAO2 % BLDV: 76.5 % (ref 70–75)
SODIUM SERPL-SCNC: 136 MMOL/L (ref 136–145)
TROPONIN T SERPL HS-MCNC: 12 NG/L
WBC # BLD AUTO: 11.4 10E3/UL (ref 4–11)

## 2023-05-13 PROCEDURE — 36415 COLL VENOUS BLD VENIPUNCTURE: CPT | Performed by: EMERGENCY MEDICINE

## 2023-05-13 PROCEDURE — 94640 AIRWAY INHALATION TREATMENT: CPT

## 2023-05-13 PROCEDURE — 84484 ASSAY OF TROPONIN QUANT: CPT | Performed by: EMERGENCY MEDICINE

## 2023-05-13 PROCEDURE — 93005 ELECTROCARDIOGRAM TRACING: CPT | Performed by: EMERGENCY MEDICINE

## 2023-05-13 PROCEDURE — 83605 ASSAY OF LACTIC ACID: CPT | Performed by: EMERGENCY MEDICINE

## 2023-05-13 PROCEDURE — 99285 EMERGENCY DEPT VISIT HI MDM: CPT | Mod: 25

## 2023-05-13 PROCEDURE — 83880 ASSAY OF NATRIURETIC PEPTIDE: CPT | Performed by: EMERGENCY MEDICINE

## 2023-05-13 PROCEDURE — 80053 COMPREHEN METABOLIC PANEL: CPT | Performed by: EMERGENCY MEDICINE

## 2023-05-13 PROCEDURE — 250N000011 HC RX IP 250 OP 636: Performed by: EMERGENCY MEDICINE

## 2023-05-13 PROCEDURE — 250N000009 HC RX 250: Performed by: EMERGENCY MEDICINE

## 2023-05-13 PROCEDURE — 71275 CT ANGIOGRAPHY CHEST: CPT

## 2023-05-13 PROCEDURE — 99223 1ST HOSP IP/OBS HIGH 75: CPT | Mod: AI | Performed by: INTERNAL MEDICINE

## 2023-05-13 PROCEDURE — 85025 COMPLETE CBC W/AUTO DIFF WBC: CPT | Performed by: EMERGENCY MEDICINE

## 2023-05-13 PROCEDURE — 82805 BLOOD GASES W/O2 SATURATION: CPT | Performed by: EMERGENCY MEDICINE

## 2023-05-13 PROCEDURE — 96375 TX/PRO/DX INJ NEW DRUG ADDON: CPT

## 2023-05-13 RX ORDER — IOPAMIDOL 755 MG/ML
100 INJECTION, SOLUTION INTRAVASCULAR ONCE
Status: COMPLETED | OUTPATIENT
Start: 2023-05-13 | End: 2023-05-13

## 2023-05-13 RX ORDER — LEVOFLOXACIN 5 MG/ML
750 INJECTION, SOLUTION INTRAVENOUS ONCE
Status: COMPLETED | OUTPATIENT
Start: 2023-05-13 | End: 2023-05-14

## 2023-05-13 RX ORDER — METHYLPREDNISOLONE SODIUM SUCCINATE 125 MG/2ML
125 INJECTION, POWDER, LYOPHILIZED, FOR SOLUTION INTRAMUSCULAR; INTRAVENOUS ONCE
Status: COMPLETED | OUTPATIENT
Start: 2023-05-13 | End: 2023-05-13

## 2023-05-13 RX ORDER — LEVOFLOXACIN 5 MG/ML
500 INJECTION, SOLUTION INTRAVENOUS ONCE
Status: DISCONTINUED | OUTPATIENT
Start: 2023-05-13 | End: 2023-05-13 | Stop reason: DRUGHIGH

## 2023-05-13 RX ORDER — IPRATROPIUM BROMIDE AND ALBUTEROL SULFATE 2.5; .5 MG/3ML; MG/3ML
3 SOLUTION RESPIRATORY (INHALATION) ONCE
Status: COMPLETED | OUTPATIENT
Start: 2023-05-13 | End: 2023-05-13

## 2023-05-13 RX ADMIN — IOPAMIDOL 100 ML: 755 INJECTION, SOLUTION INTRAVENOUS at 22:35

## 2023-05-13 RX ADMIN — IPRATROPIUM BROMIDE AND ALBUTEROL SULFATE 3 ML: 2.5; .5 SOLUTION RESPIRATORY (INHALATION) at 21:58

## 2023-05-13 RX ADMIN — METHYLPREDNISOLONE SODIUM SUCCINATE 125 MG: 125 INJECTION, POWDER, LYOPHILIZED, FOR SOLUTION INTRAMUSCULAR; INTRAVENOUS at 21:50

## 2023-05-13 ASSESSMENT — ACTIVITIES OF DAILY LIVING (ADL): ADLS_ACUITY_SCORE: 35

## 2023-05-14 ENCOUNTER — APPOINTMENT (OUTPATIENT)
Dept: PHYSICAL THERAPY | Facility: HOSPITAL | Age: 73
DRG: 193 | End: 2023-05-14
Attending: INTERNAL MEDICINE
Payer: COMMERCIAL

## 2023-05-14 PROBLEM — J96.01 ACUTE RESPIRATORY FAILURE WITH HYPOXIA (H): Status: ACTIVE | Noted: 2023-05-14

## 2023-05-14 PROBLEM — J18.9 PNEUMONIA DUE TO INFECTIOUS ORGANISM, UNSPECIFIED LATERALITY, UNSPECIFIED PART OF LUNG: Status: ACTIVE | Noted: 2023-05-14

## 2023-05-14 PROCEDURE — 87040 BLOOD CULTURE FOR BACTERIA: CPT | Performed by: EMERGENCY MEDICINE

## 2023-05-14 PROCEDURE — 250N000011 HC RX IP 250 OP 636: Performed by: HOSPITALIST

## 2023-05-14 PROCEDURE — 96366 THER/PROPH/DIAG IV INF ADDON: CPT

## 2023-05-14 PROCEDURE — 94640 AIRWAY INHALATION TREATMENT: CPT | Mod: 76

## 2023-05-14 PROCEDURE — 250N000011 HC RX IP 250 OP 636: Performed by: INTERNAL MEDICINE

## 2023-05-14 PROCEDURE — 87070 CULTURE OTHR SPECIMN AEROBIC: CPT | Performed by: INTERNAL MEDICINE

## 2023-05-14 PROCEDURE — 120N000001 HC R&B MED SURG/OB

## 2023-05-14 PROCEDURE — 36415 COLL VENOUS BLD VENIPUNCTURE: CPT | Performed by: EMERGENCY MEDICINE

## 2023-05-14 PROCEDURE — 97161 PT EVAL LOW COMPLEX 20 MIN: CPT | Mod: GP

## 2023-05-14 PROCEDURE — 258N000003 HC RX IP 258 OP 636: Performed by: INTERNAL MEDICINE

## 2023-05-14 PROCEDURE — 250N000013 HC RX MED GY IP 250 OP 250 PS 637: Performed by: EMERGENCY MEDICINE

## 2023-05-14 PROCEDURE — 250N000009 HC RX 250: Performed by: HOSPITALIST

## 2023-05-14 PROCEDURE — 250N000013 HC RX MED GY IP 250 OP 250 PS 637: Performed by: HOSPITALIST

## 2023-05-14 PROCEDURE — 99232 SBSQ HOSP IP/OBS MODERATE 35: CPT | Performed by: HOSPITALIST

## 2023-05-14 PROCEDURE — 96365 THER/PROPH/DIAG IV INF INIT: CPT | Mod: 59

## 2023-05-14 PROCEDURE — G0463 HOSPITAL OUTPT CLINIC VISIT: HCPCS

## 2023-05-14 PROCEDURE — 87205 SMEAR GRAM STAIN: CPT | Performed by: INTERNAL MEDICINE

## 2023-05-14 PROCEDURE — 272N000202 HC AEROBIKA WITH MANOMETER

## 2023-05-14 PROCEDURE — 94799 UNLISTED PULMONARY SVC/PX: CPT

## 2023-05-14 PROCEDURE — 999N000157 HC STATISTIC RCP TIME EA 10 MIN

## 2023-05-14 PROCEDURE — 94640 AIRWAY INHALATION TREATMENT: CPT

## 2023-05-14 PROCEDURE — 250N000013 HC RX MED GY IP 250 OP 250 PS 637: Performed by: INTERNAL MEDICINE

## 2023-05-14 PROCEDURE — 250N000011 HC RX IP 250 OP 636: Performed by: EMERGENCY MEDICINE

## 2023-05-14 RX ORDER — LISINOPRIL 5 MG/1
10 TABLET ORAL DAILY
Status: DISCONTINUED | OUTPATIENT
Start: 2023-05-14 | End: 2023-05-15 | Stop reason: HOSPADM

## 2023-05-14 RX ORDER — LEVALBUTEROL INHALATION SOLUTION 1.25 MG/3ML
1.25 SOLUTION RESPIRATORY (INHALATION) 4 TIMES DAILY
Status: DISCONTINUED | OUTPATIENT
Start: 2023-05-14 | End: 2023-05-15 | Stop reason: HOSPADM

## 2023-05-14 RX ORDER — AMOXICILLIN 250 MG
2 CAPSULE ORAL 2 TIMES DAILY PRN
Status: DISCONTINUED | OUTPATIENT
Start: 2023-05-14 | End: 2023-05-15 | Stop reason: HOSPADM

## 2023-05-14 RX ORDER — LEVALBUTEROL INHALATION SOLUTION 1.25 MG/3ML
1.25 SOLUTION RESPIRATORY (INHALATION) EVERY 4 HOURS PRN
Status: DISCONTINUED | OUTPATIENT
Start: 2023-05-14 | End: 2023-05-15 | Stop reason: HOSPADM

## 2023-05-14 RX ORDER — NICOTINE 21 MG/24HR
1 PATCH, TRANSDERMAL 24 HOURS TRANSDERMAL DAILY
Status: DISCONTINUED | OUTPATIENT
Start: 2023-05-14 | End: 2023-05-15 | Stop reason: HOSPADM

## 2023-05-14 RX ORDER — LIDOCAINE 40 MG/G
CREAM TOPICAL
Status: DISCONTINUED | OUTPATIENT
Start: 2023-05-14 | End: 2023-05-15 | Stop reason: HOSPADM

## 2023-05-14 RX ORDER — METHYLPREDNISOLONE SODIUM SUCCINATE 40 MG/ML
40 INJECTION, POWDER, LYOPHILIZED, FOR SOLUTION INTRAMUSCULAR; INTRAVENOUS EVERY 12 HOURS
Status: DISCONTINUED | OUTPATIENT
Start: 2023-05-14 | End: 2023-05-14

## 2023-05-14 RX ORDER — NICOTINE 21 MG/24HR
1 PATCH, TRANSDERMAL 24 HOURS TRANSDERMAL DAILY
Status: DISCONTINUED | OUTPATIENT
Start: 2023-05-14 | End: 2023-05-14

## 2023-05-14 RX ORDER — GUAIFENESIN 600 MG/1
1200 TABLET, EXTENDED RELEASE ORAL 2 TIMES DAILY
Status: DISCONTINUED | OUTPATIENT
Start: 2023-05-14 | End: 2023-05-15 | Stop reason: HOSPADM

## 2023-05-14 RX ORDER — LEVOFLOXACIN 5 MG/ML
750 INJECTION, SOLUTION INTRAVENOUS EVERY 24 HOURS
Status: DISCONTINUED | OUTPATIENT
Start: 2023-05-14 | End: 2023-05-15 | Stop reason: HOSPADM

## 2023-05-14 RX ORDER — FOLIC ACID 1 MG/1
1 TABLET ORAL DAILY
Status: DISCONTINUED | OUTPATIENT
Start: 2023-05-14 | End: 2023-05-14

## 2023-05-14 RX ORDER — PROCHLORPERAZINE MALEATE 5 MG
5 TABLET ORAL EVERY 6 HOURS PRN
Status: DISCONTINUED | OUTPATIENT
Start: 2023-05-14 | End: 2023-05-15 | Stop reason: HOSPADM

## 2023-05-14 RX ORDER — ATORVASTATIN CALCIUM 40 MG/1
80 TABLET, FILM COATED ORAL EVERY EVENING
Status: DISCONTINUED | OUTPATIENT
Start: 2023-05-14 | End: 2023-05-14

## 2023-05-14 RX ORDER — FOLIC ACID 1 MG/1
1 TABLET ORAL EVERY EVENING
Status: DISCONTINUED | OUTPATIENT
Start: 2023-05-14 | End: 2023-05-15 | Stop reason: HOSPADM

## 2023-05-14 RX ORDER — METHYLPREDNISOLONE SODIUM SUCCINATE 40 MG/ML
40 INJECTION, POWDER, LYOPHILIZED, FOR SOLUTION INTRAMUSCULAR; INTRAVENOUS EVERY 6 HOURS
Status: DISCONTINUED | OUTPATIENT
Start: 2023-05-14 | End: 2023-05-15 | Stop reason: HOSPADM

## 2023-05-14 RX ORDER — AMOXICILLIN 250 MG
1 CAPSULE ORAL 2 TIMES DAILY PRN
Status: DISCONTINUED | OUTPATIENT
Start: 2023-05-14 | End: 2023-05-15 | Stop reason: HOSPADM

## 2023-05-14 RX ORDER — CALCIUM CARBONATE 500 MG/1
1000 TABLET, CHEWABLE ORAL 4 TIMES DAILY PRN
Status: DISCONTINUED | OUTPATIENT
Start: 2023-05-14 | End: 2023-05-15 | Stop reason: HOSPADM

## 2023-05-14 RX ORDER — ATORVASTATIN CALCIUM 40 MG/1
80 TABLET, FILM COATED ORAL EVERY EVENING
Status: DISCONTINUED | OUTPATIENT
Start: 2023-05-15 | End: 2023-05-15 | Stop reason: HOSPADM

## 2023-05-14 RX ORDER — LORAZEPAM 0.5 MG/1
0.25 TABLET ORAL EVERY 8 HOURS PRN
Status: DISCONTINUED | OUTPATIENT
Start: 2023-05-14 | End: 2023-05-15 | Stop reason: HOSPADM

## 2023-05-14 RX ORDER — PROCHLORPERAZINE 25 MG
12.5 SUPPOSITORY, RECTAL RECTAL EVERY 12 HOURS PRN
Status: DISCONTINUED | OUTPATIENT
Start: 2023-05-14 | End: 2023-05-15 | Stop reason: HOSPADM

## 2023-05-14 RX ORDER — ATORVASTATIN CALCIUM 40 MG/1
80 TABLET, FILM COATED ORAL DAILY
Status: DISCONTINUED | OUTPATIENT
Start: 2023-05-14 | End: 2023-05-14

## 2023-05-14 RX ADMIN — FOLIC ACID 1 MG: 1 TABLET ORAL at 20:56

## 2023-05-14 RX ADMIN — SERTRALINE HYDROCHLORIDE 150 MG: 50 TABLET ORAL at 20:56

## 2023-05-14 RX ADMIN — LISINOPRIL 10 MG: 5 TABLET ORAL at 12:39

## 2023-05-14 RX ADMIN — GUAIFENESIN 1200 MG: 600 TABLET ORAL at 17:12

## 2023-05-14 RX ADMIN — LEVOFLOXACIN 750 MG: 5 INJECTION, SOLUTION INTRAVENOUS at 00:18

## 2023-05-14 RX ADMIN — ATORVASTATIN CALCIUM 80 MG: 40 TABLET, FILM COATED ORAL at 12:38

## 2023-05-14 RX ADMIN — LEVALBUTEROL HYDROCHLORIDE 1.25 MG: 1.25 SOLUTION RESPIRATORY (INHALATION) at 16:43

## 2023-05-14 RX ADMIN — LEVOFLOXACIN 750 MG: 5 INJECTION, SOLUTION INTRAVENOUS at 22:41

## 2023-05-14 RX ADMIN — LEVALBUTEROL HYDROCHLORIDE 1.25 MG: 1.25 SOLUTION RESPIRATORY (INHALATION) at 20:37

## 2023-05-14 RX ADMIN — IPRATROPIUM BROMIDE 0.5 MG: 0.5 SOLUTION RESPIRATORY (INHALATION) at 11:15

## 2023-05-14 RX ADMIN — NICOTINE 1 PATCH: 21 PATCH, EXTENDED RELEASE TRANSDERMAL at 12:39

## 2023-05-14 RX ADMIN — IPRATROPIUM BROMIDE 0.5 MG: 0.5 SOLUTION RESPIRATORY (INHALATION) at 16:44

## 2023-05-14 RX ADMIN — NICOTINE 1 PATCH: 21 PATCH, EXTENDED RELEASE TRANSDERMAL at 00:44

## 2023-05-14 RX ADMIN — METHYLPREDNISOLONE SODIUM SUCCINATE 40 MG: 40 INJECTION INTRAMUSCULAR; INTRAVENOUS at 12:38

## 2023-05-14 RX ADMIN — SODIUM CHLORIDE 500 ML: 9 INJECTION, SOLUTION INTRAVENOUS at 04:35

## 2023-05-14 RX ADMIN — LEVALBUTEROL HYDROCHLORIDE 1.25 MG: 1.25 SOLUTION RESPIRATORY (INHALATION) at 11:14

## 2023-05-14 RX ADMIN — METHYLPREDNISOLONE SODIUM SUCCINATE 40 MG: 40 INJECTION INTRAMUSCULAR; INTRAVENOUS at 22:41

## 2023-05-14 RX ADMIN — IPRATROPIUM BROMIDE 0.5 MG: 0.5 SOLUTION RESPIRATORY (INHALATION) at 20:37

## 2023-05-14 RX ADMIN — METHYLPREDNISOLONE SODIUM SUCCINATE 40 MG: 40 INJECTION INTRAMUSCULAR; INTRAVENOUS at 17:56

## 2023-05-14 ASSESSMENT — ACTIVITIES OF DAILY LIVING (ADL)
EQUIPMENT_CURRENTLY_USED_AT_HOME: CANE, STRAIGHT
ADLS_ACUITY_SCORE: 35
ADLS_ACUITY_SCORE: 18
ADLS_ACUITY_SCORE: 35
FALL_HISTORY_WITHIN_LAST_SIX_MONTHS: NO
ADLS_ACUITY_SCORE: 18
ADLS_ACUITY_SCORE: 35
DOING_ERRANDS_INDEPENDENTLY_DIFFICULTY: NO
WALKING_OR_CLIMBING_STAIRS_DIFFICULTY: NO
ADLS_ACUITY_SCORE: 35
TOILETING_ISSUES: NO
DIFFICULTY_EATING/SWALLOWING: NO
WEAR_GLASSES_OR_BLIND: NO
DRESSING/BATHING_DIFFICULTY: NO
CONCENTRATING,_REMEMBERING_OR_MAKING_DECISIONS_DIFFICULTY: NO
CHANGE_IN_FUNCTIONAL_STATUS_SINCE_ONSET_OF_CURRENT_ILLNESS/INJURY: NO
ADLS_ACUITY_SCORE: 35
ADLS_ACUITY_SCORE: 35
DEPENDENT_IADLS:: INDEPENDENT
ADLS_ACUITY_SCORE: 35
ADLS_ACUITY_SCORE: 35

## 2023-05-14 NOTE — PLAN OF CARE
Occupational Therapy Discharge Summary    Occupational Therapy: Orders received. Chart reviewed and discussed with care team.? Occupational Therapy not indicated due at this time per PT report pt is Ind. W/ ADL routine/mobility- pt declines working w/ therapy.? Defer discharge recommendations to interdisciplinary team.? Will complete orders.         SITA Uriostegui, OTR/L, 5/14/2023, 10:04 AM

## 2023-05-14 NOTE — PROGRESS NOTES
Pt ind w/ mobility at this time. No further skilled PT needs. Should pt status change, please re-order as appropriate. Thank you!     05/14/23 0900   Appointment Info   Signing Clinician's Name / Credentials (PT) Zully Roper DPT   Living Environment   People in Home spouse   Current Living Arrangements house   Home Accessibility stairs within home   Number of Stairs, Within Home, Primary greater than 10 stairs   Stair Railings, Within Home, Primary railings on both sides of stairs   Self-Care   Usual Activity Tolerance good   Current Activity Tolerance fair   Equipment Currently Used at Home cane, straight  (outside of home)   Activity/Exercise/Self-Care Comment ind w/ activities at baseline, sees PT for prior foot surgery, uses a cane for balance d/t foot surgery (1 year ago)   General Information   Onset of Illness/Injury or Date of Surgery 05/13/23   Referring Physician Lan Montalvo MD   Patient/Family Therapy Goals Statement (PT) return home   Pertinent History of Current Problem (include personal factors and/or comorbidities that impact the POC) history of hypertension, restless leg syndrome, anxiety disorder, and hyperlipidemia admitted on 5/13/2023 with cough and shortness of breath. CT showed mucoid impaction of a number of right middle lobe and lower lobe airways with scattered tree-in-bud nodular opacities throughout the right lower lobe, compatible with aspiration/infection and 1.4 cm likely cystic lesion of the pancreatic tail, possibly representing a small sidebranch IPMN for which MRI/MRCP is recommended   Existing Precautions/Restrictions oxygen therapy device and L/min   Cognition   Affect/Mental Status (Cognition) WFL   Pain Assessment   Patient Currently in Pain No   Range of Motion (ROM)   Range of Motion ROM is WFL   Strength (Manual Muscle Testing)   Strength (Manual Muscle Testing) strength is WFL   Bed Mobility   Bed Mobility sit-supine   Sit-Supine [place] (Bed  Mobility) modified independence  (with rail, HOB elevated)   Comment, (Bed Mobility) feels SOB after amb back from bathroom to bed   Transfers   Transfers sit-stand transfer   Sit-Stand Transfer   Sit-Stand Cross (Transfers) independent   Assistive Device (Sit-Stand Transfers) other (see comments)  (none)   Comment, (Sit-Stand Transfer) no difficulty   Gait/Stairs (Locomotion)   Cross Level (Gait) independent   Assistive Device (Gait)   (pushing portable O2 tank)   Distance in Feet 20'   Comment, (Gait/Stairs) declines stairs trial - reports has no needs. Pt SOB after amb from bathroom, O2 at 90% on 3L when back to bed. no LOB noted   Clinical Impression   Criteria for Skilled Therapeutic Intervention Evaluation only   PT Diagnosis (PT) impaired functional mobility   Influenced by the following impairments decreased endurance   Functional limitations due to impairments gait, stairs   Clinical Presentation (PT Evaluation Complexity) Stable/Uncomplicated   Clinical Presentation Rationale pt presents as medically diagnosed   Clinical Decision Making (Complexity) low complexity   Planned Therapy Interventions (PT) gait training;stair training   Anticipated Equipment Needs at Discharge (PT) cane, straight   Risk & Benefits of therapy have been explained evaluation/treatment results reviewed;patient   Clinical Impression Comments Pt educated on energy conservation techniques for returning home with pneumonia. Instructed pt to use FWW as needed for endurance if she feels SOB. Instructed to do stairs one at a times for endurance and to hold onto both railings. Pt agreeable and reported understanding of education.   PT Total Evaluation Time   PT Eval, Low Complexity Minutes (85503) 15   PT Discharge Planning   PT Plan dc PT   PT Discharge Recommendation (DC Rec) home with assist   PT Rationale for DC Rec no further skilled PT needs at this time, pt ind w/ mobility   PT Brief overview of current status amb x 20'  ind   Total Session Time   Total Session Time (sum of timed and untimed services) 15

## 2023-05-14 NOTE — PHARMACY-ADMISSION MEDICATION HISTORY
Pharmacist Admission Medication History    Admission medication history is complete. The information provided in this note is only as accurate as the sources available at the time of the update.    Medication reconciliation/reorder completed by provider prior to medication history? No    Information Source(s): Patient and CareEverywhere/SureScripts via in-person    Pertinent Information: None    Changes made to PTA medication list:    Added: [place]    Deleted: Clonidine, Zofran, pramipexole    Changed: Lorazepam 0.5mg to 0.25mg    Medication Affordability:  Not including over the counter (OTC) medications, was there a time in the past 3 months when you did not take your medications as prescribed because of cost?: No    Allergies reviewed with patient and updates made in EHR: yes    Medication History Completed By: Aviva Barnhart [facility] 5/13/2023 9:59 PM    Prior to Admission medications    Medication Sig Last Dose Taking? Auth Provider Long Term End Date   atorvastatin (LIPITOR) 80 MG tablet Take 80 mg by mouth daily 5/12/2023 Yes Reported, Patient Yes    Buprenorphine HCl (BELBUCA) 600 MCG FILM buccal film Place 600 mcg inside cheek every 12 hours 5/13/2023 at 1200 Yes Unknown, Entered By History     folic acid (FOLVITE) 1 MG tablet Take 1 mg by mouth daily 5/12/2023 Yes Reported, Patient     lisinopril (ZESTRIL) 10 MG tablet Take 10 mg by mouth daily 5/13/2023 Yes Reported, Patient Yes    LORazepam (ATIVAN) 0.5 MG tablet Take 0.25 mg by mouth every 8 hours as needed  Yes Reported, Patient     naproxen (NAPROSYN) 500 MG tablet Take 500 mg by mouth 2 times daily as needed for moderate pain  Yes Reported, Patient     nystatin (MYCOSTATIN) 589108 UNIT/GM external cream daily as needed  Yes Reported, Patient     oxyCODONE IR (ROXICODONE) 10 MG tablet Take 5 mg by mouth every 6 hours as needed 5/13/2023 at am Yes Reported, Patient     sertraline (ZOLOFT) 100 MG tablet Take 150 mg by mouth daily 5/12/2023 Yes Reported,  Patient Yes

## 2023-05-14 NOTE — ED NOTES
Pt resting comfortably in bed- provided with coffee per request. Vitals remain stable. No additional requests at this time.

## 2023-05-14 NOTE — PLAN OF CARE
Problem: Plan of Care - These are the overarching goals to be used throughout the patient stay.    Goal: Absence of Hospital-Acquired Illness or Injury  Outcome: Progressing  Intervention: Identify and Manage Fall Risk  Recent Flowsheet Documentation  Taken 5/14/2023 0855 by Chuyita Cortez RN  Safety Promotion/Fall Prevention:   assistive device/personal items within reach   clutter free environment maintained   lighting adjusted   nonskid shoes/slippers when out of bed   patient and family education   room near nurse's station   room door open   room organization consistent   safety round/check completed  Intervention: Prevent Skin Injury  Recent Flowsheet Documentation  Taken 5/14/2023 0846 by Chuyita Cortez RN  Body Position: position changed independently  Goal: Optimal Comfort and Wellbeing  Outcome: Progressing  Intervention: Monitor Pain and Promote Comfort  Recent Flowsheet Documentation  Taken 5/14/2023 1255 by Chuyita Cortez RN  Pain Management Interventions: medication (see MAR)  Taken 5/14/2023 0846 by Chuyita Cortez RN  Pain Management Interventions: (pt only taking scheduled q 12 hr buprenorphine films, states is on pain contract so has to take only that and no pain meds that don't come from that program)   rest   medication offered but refused  Intervention: Provide Person-Centered Care  Recent Flowsheet Documentation  Taken 5/14/2023 1210 by Chuyita Cortez RN  Trust Relationship/Rapport:   care explained   empathic listening provided   reassurance provided   thoughts/feelings acknowledged   questions answered   choices provided   emotional support provided   questions encouraged  Taken 5/14/2023 0855 by Chuyita Cortez RN  Trust Relationship/Rapport:   care explained   empathic listening provided   reassurance provided   thoughts/feelings acknowledged   questions answered   choices provided   emotional support provided   questions encouraged  Goal: Readiness for Transition of Care  Outcome:  Progressing     Problem: Risk for Delirium  Goal: Optimal Coping  Outcome: Progressing   Goal Outcome Evaluation:         Pt a/o x4, pleasant, cooperative. Reports has pain in left foot, chronic--rates at 6-7/10--said only takes buprenorphine film scheduled q 12 hrs--said is in pain program so can only take what is given by the program; so is declining prn pain meds here. 2 buprenorphine films from home sent to pharmacy this am to take for 24 hr period.  Pt on 4L per n/c this am, weaned to 3L later this am; writer attempted to wean to 2L this afternoon, but sats down to 88-89% so turned back up to 3L. Pt ate small amount of meals-said hasn't been eating too much recently. Pt up to bathroom w/ portable oxygen independently, gait steady. Pt's  visiting at bedside for approx 1/2 of shift. Tele tracing normal sinus rhythm, occasionally sinus tachy up to 110's. Pt transferring to 105 at this time via stretcher w/ belongings. Report given to Naz DE LUNA.

## 2023-05-14 NOTE — ED PROVIDER NOTES
EMERGENCY DEPARTMENT ENCOUNTER      NAME: Patricia Mcnamara  AGE: 73 year old female  YOB: 1950  MRN: [id number]  EVALUATION DATE & TIME: 5/13/2023  9:27 PM    PCP: No Ref-Primary, Physician    ED PROVIDER: Danisha Joya MD      Chief Complaint   Patient presents with     Shortness of Breath         FINAL IMPRESSION:  1. Acute respiratory failure with hypoxia (H)    2. Pneumonia due to infectious organism, unspecified laterality, unspecified part of lung          ED COURSE & MEDICAL DECISION MAKING:    Pertinent Labs & Imaging studies reviewed. (See chart for details)    9:35 PM I introduced myself to the patient, obtained patient history, performed a physical exam, and discussed plan for ED workup including potential diagnostic laboratory/imaging studies and interventions.  12:40 AM I spoke with the hospitalist, Dr. Montalvo. We discussed the patient's case and they agree to admit the patient.       ED Course as of 05/14/23 0041   Sat May 13, 2023   2146 Patient is a 73-year-old female with history of tobacco use, hypertension, hyperlipidemia, chronic pain who presents for evaluation of worsening shortness of breath and cough over the past 3 days.  She presents from the urgency room where she was evaluated and found to be hypoxic in the 80s.  She had also noted with her pulse ox at home that her oxygen level was in the 80s.  She denies any known diagnosis of COPD or heart disease.  She had a chest x-ray performed and lab work performed at the urgency center which I reviewed.   2148 D-dimer elevated at 1.6 and not yet had a CT scan.  BNP is elevated at 1000.  Blood cell count 10.8 with a hemoglobin of 15.  COVID-19 PCR negative.  Troponin within normal limits further scale.  BMP reveals a normal creatinine.  Chest x-ray revealed normal heart size and normal pulmonary vessels.  Slight opacities at both apices Mo suggestive of pleural thickening.  Lungs otherwise clear.  She was requiring supplemental  oxygen and is on 4 L to maintain 94%.  She has been having worsening dyspnea on exertion as well.  No history of DVT or PE or leg swelling.  She is wheezy on my exam and did receive albuterol neb at the urgent care but no steroids per EMS.  Differential diagnosis includes but is not limited to COPD exacerbation, pneumonia, unlikely to be pneumothorax with normal chest x-ray, pulmonary edema however not seen on chest x-ray, acute coronary syndrome however no chest pain making this less likely, PE, etc.  EKG was obtained and revealed sinus tachycardia with nonspecific intraventricular conduction delay and I cannot see any prior EKGs.  No obvious sign of acute ischemia.  We will repeat laboratory studies here including VBG and lactic acid.  Repeat troponin obtained.  Ordered CT of the chest with IV contrast to evaluate for PE or other pathology.  We will also give a DuoNeb and 125 mg of IV Solu-Medrol.  If CT is negative do feel that this is likely representative of COPD exacerbation we will treat as such.  She does not have known COPD however has been smoking significantly for many years.  This is likely undiagnosed.  Plan for admission for further management of her acute hypoxic respiratory failure.  She is in agreement with this plan.     White blood cell count is 11.4.  Troponin within normal limits.  .  CMP largely unremarkable.  VBG reveals a pH of 7.37 with a PCO2 of 51 and bicarb of 30.  This is likely chronic.  Lactic acid within normal limits.    CT of the chest does not reveal any evidence of PE.  There is mucoid impaction of a number of right middle lobe and lower lobe airways with scattered tree-in-bud nodular opacities throughout the right lower lobe compatible with aspiration/infection.  Incidentally noted cystic lesion of the pancreas tail.  2 peripheral blood cultures were obtained.  Will treat with IV antibiotics for the potential infection.  She has cephalosporin allergy so we will treat with  IV Levaquin which was ordered.  Discussed with the hospitalist who accepted the patient for admission.  She was in agreement with this plan.  She was admitted in stable condition.      At the conclusion of the encounter I discussed the results of all of the tests and the disposition. The questions were answered. The patient or family acknowledged understanding and was agreeable with the care plan.           Medical Decision Making    History:    Supplemental history from: N/A    External Record(s) reviewed: Documented in chart, if applicable.    Work Up:    Chart documentation includes differential considered and any EKGs or imaging independently interpreted by provider.    In additional to work up documented, I considered the following work up: Documented in chart, if applicable.    External consultation:    Discussion of management with another provider: Documented in chart, if applicable    Complicating factors:    Care impacted by chronic illness: Hyperlipidemia, Hypertension and Smoking / Nicotine Use    Care affected by social determinants of health: N/A    Disposition considerations: Admit.      MEDICATIONS GIVEN IN THE EMERGENCY:  Medications   levofloxacin (LEVAQUIN) infusion 750 mg (750 mg Intravenous $New Bag 5/14/23 0018)   nicotine Patch in Place (has no administration in time range)   nicotine (NICODERM CQ) 21 MG/24HR 24 hr patch 1 patch (has no administration in time range)   ipratropium - albuterol 0.5 mg/2.5 mg/3 mL (DUONEB) neb solution 3 mL (3 mLs Nebulization $Given 5/13/23 2158)   methylPREDNISolone sodium succinate (solu-MEDROL) injection 125 mg (125 mg Intravenous $Given 5/13/23 2150)   Buprenorphine HCl (BELBUCA) buccal film 600 mcg (600 mcg Buccal $Given 5/14/23 0020)   iopamidol (ISOVUE-370) solution 100 mL (100 mLs Intravenous $Given 5/13/23 2235)       NEW PRESCRIPTIONS STARTED AT TODAY'S ER VISIT  New Prescriptions    No medications on file           =================================================================    HPI    Patient information was obtained from: Patient    Use of : N/A      Patricia Mcnamara is a 73 year old female with a pertinent history of HTN, HLD, GERD, and tobacco use who presents to this ED for evaluation of shortness of breath.    Per chart review, the patient was seen at the [facility] on 5/13/23 presenting for evaluation of shortness of breath. The patient reported 2 days of increasing shortness of breath with severe dyspnea on exertion and wheezing. On exam, patient with prolonged expiratory phase and mild diffuse wheezes.  She was hypoxic to 88% on room air there.  The patient was given albuterol, but had no improvement of symptoms. EKG without ST changes. Labs were significant for elevated D-dimer, COVID negative, normal BMP and reassuring CBC. Chest x-ray without signs of pneumothorax. The patient was placed on nasal cannula oxygen and directed to the ED for further evaluation, transported via EMS.    The patient reports she has had 2 days of worsening shortness of breath and wheezing with a productive cough. Her cough is productive of clear phlegm. Her shortness of breath is worse with exertion and when bending over. She has had an on and off pain in her left shoulder and left upper back recently that she is unable to get relief with any pain medications, but does improve when she applies ice to the area. She has not had any chest pain, new abdominal pain, fever, vomiting, diarrhea, or leg swelling. She has not had any known sick contacts and has not had any recent long travel. She is a smoker, but does not have any diagnosis of COPD. She does not use inhalers or supplemental oxygen at home. She has no personal or family history of DVT/PE and has no family cardiac history.  Denies any personal history of cardiac disease.    REVIEW OF SYSTEMS   Review of Systems   Pertinent positives and negatives are  "documented in the HPI. All other systems reviewed and are negative.      PAST MEDICAL HISTORY:  History reviewed. No pertinent past medical history.    PAST SURGICAL HISTORY:  History reviewed. No pertinent surgical history.        CURRENT MEDICATIONS:    atorvastatin (LIPITOR) 80 MG tablet  Buprenorphine HCl (BELBUCA) 600 MCG FILM buccal film  folic acid (FOLVITE) 1 MG tablet  lisinopril (ZESTRIL) 10 MG tablet  LORazepam (ATIVAN) 0.5 MG tablet  naproxen (NAPROSYN) 500 MG tablet  nystatin (MYCOSTATIN) 905022 UNIT/GM external cream  oxyCODONE IR (ROXICODONE) 10 MG tablet  sertraline (ZOLOFT) 100 MG tablet        ALLERGIES:  Allergies   Allergen Reactions     Diclofenac Anaphylaxis     Ketorolac Anaphylaxis     Ketorolac Tromethamine Anaphylaxis     Sulfa Antibiotics Anaphylaxis     Cephradine      Other reaction(s): *Unknown     Morphine Nausea       FAMILY HISTORY:  History reviewed. No pertinent family history.    SOCIAL HISTORY:   Social History     Socioeconomic History     Marital status:        VITALS:  BP (!) 155/93   Pulse 108   Temp 98.7  F (37.1  C) (Oral)   Resp 21   Ht 1.689 m (5' 6.5\")   Wt 76.2 kg (168 lb)   SpO2 93%   BMI 26.71 kg/m      PHYSICAL EXAM    Physical Exam  Constitutional: Well developed, Well nourished, NAD, GCS 15  HENT: Normocephalic, Atraumatic, Bilateral external ears normal, Oropharynx normal, mucous membranes moist, Nose normal. Neck-  Normal range of motion, No tenderness, Supple, No stridor.   Eyes: PERRL, EOMI, Conjunctiva normal, No discharge.   Respiratory: Bilateral wheezing noted.  No obvious crackles.  Satting 94% on 4 L nasal cannula.  No accessory muscle use.  Speaks in full sentences.  Cardiovascular: Normal heart rate, Regular rhythm, No murmurs, No rubs, No gallops. 2+ radial pulses bilaterally  GI: Bowel sounds normal, Soft, No tenderness, No masses, No rebound or guarding.   Musculoskeletal: 2+ DP pulses. No notable lower extremity edema. Good range of " motion in all major joints. No tenderness to palpation or major deformities noted. No tenderness of the CTLS spine.   Integument: Warm, Dry, No erythema, No rash. No petechiae.    Neurologic: Alert & oriented x 3, 5/5 strength in all 4 extremities bilaterally. Sensation intact to light touch in all 4 extremities and the face bilaterally. No focal deficits noted.   Psychiatric: Affect normal, Cooperative.      LAB:  All pertinent labs reviewed and interpreted.  Results for orders placed or performed during the hospital encounter of 05/13/23   CT Chest Pulmonary Embolism w Contrast    Impression    IMPRESSION:  1.  No pulmonary embolus.  2.  Mucoid impaction of a number of right middle lobe and lower lobe airways with scattered tree-in-bud nodular opacities throughout the right lower lobe, compatible with aspiration/infection.  3.  Incidentally noted is a 1.4 cm likely cystic lesion of the pancreatic tail, possibly representing a small sidebranch IPMN. Recommend MRI/MRCP for further evaluation which may be performed on a nonemergent outpatient basis.   Comprehensive metabolic panel   Result Value Ref Range    Sodium 136 136 - 145 mmol/L    Potassium 3.9 3.4 - 5.3 mmol/L    Chloride 102 98 - 107 mmol/L    Carbon Dioxide (CO2) 27 22 - 29 mmol/L    Anion Gap 7 7 - 15 mmol/L    Urea Nitrogen 9.8 8.0 - 23.0 mg/dL    Creatinine 0.51 0.51 - 0.95 mg/dL    Calcium 10.4 (H) 8.8 - 10.2 mg/dL    Glucose 109 (H) 70 - 99 mg/dL    Alkaline Phosphatase 117 (H) 35 - 104 U/L    AST 40 (H) 10 - 35 U/L    ALT 33 10 - 35 U/L    Protein Total 7.2 6.4 - 8.3 g/dL    Albumin 3.8 3.5 - 5.2 g/dL    Bilirubin Total 0.3 <=1.2 mg/dL    GFR Estimate >90 >60 mL/min/1.73m2   Result Value Ref Range    Troponin T, High Sensitivity 12 <=14 ng/L   Nt probnp inpatient (BNP)   Result Value Ref Range    N terminal Pro BNP Inpatient 831 0 - 900 pg/mL   Lactic acid whole blood   Result Value Ref Range    Lactic Acid 0.9 0.7 - 2.0 mmol/L   Blood gas venous    Result Value Ref Range    pH Venous 7.37 7.35 - 7.45    pCO2 Venous 51 (H) 35 - 50 mm Hg    pO2 Venous 40 25 - 47 mm Hg    Bicarbonate Venous 30 24 - 30 mmol/L    Base Excess/Deficit (+/-) 4.6   mmol/L    Oxyhemoglobin Venous 74.2 70.0 - 75.0 %    O2 Sat, Venous 76.5 (H) 70.0 - 75.0 %   CBC with platelets and differential   Result Value Ref Range    WBC Count 11.4 (H) 4.0 - 11.0 10e3/uL    RBC Count 4.95 3.80 - 5.20 10e6/uL    Hemoglobin 14.4 11.7 - 15.7 g/dL    Hematocrit 44.4 35.0 - 47.0 %    MCV 90 78 - 100 fL    MCH 29.1 26.5 - 33.0 pg    MCHC 32.4 31.5 - 36.5 g/dL    RDW 15.6 (H) 10.0 - 15.0 %    Platelet Count 160 150 - 450 10e3/uL    % Neutrophils 72 %    % Lymphocytes 21 %    % Monocytes 7 %    % Eosinophils 0 %    % Basophils 0 %    % Immature Granulocytes 0 %    NRBCs per 100 WBC 0 <1 /100    Absolute Neutrophils 8.1 1.6 - 8.3 10e3/uL    Absolute Lymphocytes 2.4 0.8 - 5.3 10e3/uL    Absolute Monocytes 0.8 0.0 - 1.3 10e3/uL    Absolute Eosinophils 0.1 0.0 - 0.7 10e3/uL    Absolute Basophils 0.0 0.0 - 0.2 10e3/uL    Absolute Immature Granulocytes 0.0 <=0.4 10e3/uL    Absolute NRBCs 0.0 10e3/uL   Extra Blue Top Tube   Result Value Ref Range    Hold Specimen JIC    Extra Red Top Tube   Result Value Ref Range    Hold Specimen JIC    Extra Heparinized Syringe   Result Value Ref Range    Hold Specimen JIC        RADIOLOGY:  Reviewed all pertinent imaging. Please see official radiology report.  CT Chest Pulmonary Embolism w Contrast   Final Result   IMPRESSION:   1.  No pulmonary embolus.   2.  Mucoid impaction of a number of right middle lobe and lower lobe airways with scattered tree-in-bud nodular opacities throughout the right lower lobe, compatible with aspiration/infection.   3.  Incidentally noted is a 1.4 cm likely cystic lesion of the pancreatic tail, possibly representing a small sidebranch IPMN. Recommend MRI/MRCP for further evaluation which may be performed on a nonemergent outpatient basis.           EKG:    Performed at: 2127    Impression: Sinus tachycardia.  Nonspecific intraventricular conduction delay.  No signs of acute ischemia.    Rate: 110  Rhythm: Sinus tachycardia  Axis: Normal  NM Interval: 150  QRS Interval: 120  QTc Interval: 476  ST Changes: None  Comparison: No comparisons    I have independently reviewed and interpreted the EKG(s) documented above.      I, Bijan Guo, am serving as a scribe to document services personally performed by Danisha Joya MD based on my observation and the provider's statements to me. I, Danisha Joya MD, attest that Bijan Guo is acting in a scribe capacity, has observed my performance of the services and has documented them in accordance with my direction.    Danisha Joya MD  [facility] EMERGENCY DEPARTMENT  [address] [place] [id number] [phone]     Danisha Joya MD  05/14/23 0568

## 2023-05-14 NOTE — ED NOTES
Pt up to restroom independently, with portable O2. She is now back in bed, IV antibiotics infusing, and vitals stable. No additional requests at this time.

## 2023-05-14 NOTE — ED TRIAGE NOTES
Pt went to [facility] today, was sent to [facility] via [email or web address]. Received Albuterol Neb at the [facility], not on thinners, no hx of blood clot in past, D dimer elevated, congested Lung sounds, no hx diagnosis of COPD. Pt had sudden SOB 2 days ago, lives at home with . Daily smoker 1 pack a day per pt. Pt on 4L to maintain 94%, 89% on room air.Pt walks independently, denies chest pain.     Triage Assessment     Row Name 05/13/23 4752       Triage Assessment (Adult)    Airway WDL airway symptoms    Additional Documentation Breath Sounds (Group)       Respiratory WDL    Respiratory WDL cough;X    Cough Frequency infrequent    Cough Type congested;productive       Breath Sounds    Breath Sounds All Fields    All Lung Fields Breath Sounds Posterior:;diminished;equal bilaterally;rhonchi       Skin Circulation/Temperature WDL    Skin Circulation/Temperature WDL WDL       Cardiac WDL    Cardiac WDL rhythm    Pulse Rate & Regularity tachycardic    Cardiac Rhythm ST       Peripheral/Neurovascular WDL    Peripheral Neurovascular WDL WDL       Cognitive/Neuro/Behavioral WDL    Cognitive/Neuro/Behavioral WDL WDL

## 2023-05-14 NOTE — PROGRESS NOTES
Patient remains on 3 lpm O2 via nasal cannula. sats low 90s. Xopenex/Atrovent given. BS diminished/wheezing. Aerations increased post treatment. Pt tolerated flutter valve. Deep breath and coughing encouraged. RT following.    Harshad Thomas, RT

## 2023-05-14 NOTE — CONSULTS
"Care Management Initial Consult    General Information  Assessment completed with: Patient, Spouse or significant other, Patricia and  Wilver  Type of CM/SW Visit: Initial Assessment    Primary Care Provider verified and updated as needed: Yes   Readmission within the last 30 days: no previous admission in last 30 days      Reason for Consult: discharge planning  Advance Care Planning: Advance Care Planning Reviewed: no concerns identified          Communication Assessment  Patient's communication style: spoken language (English or Bilingual)             Cognitive  Cognitive/Neuro/Behavioral: WDL                      Living Environment:   People in home: spouse  Patricia and  Wilver  Current living Arrangements: house      Able to return to prior arrangements: yes       Family/Social Support:  Care provided by: self  Provides care for: no one  Marital Status:     Wilver       Description of Support System: Supportive, Involved    Support Assessment: Adequate family and caregiver support, Adequate social supports, Patient communicates needs well met    Current Resources:   Patient receiving home care services: No     Community Resources: None  Equipment currently used at home: cane, straight (\"I use the cane when I go out of the house or if I am moving around a lot inside the house. I also have lots of equipment from my Mom that I can use PRN. I have walkers, wheelchair, crutches, a scooter, a shower chair, and a toilet raiser with railings\".)  Supplies currently used at home: Other (\"glasses\")    Employment/Financial:  Employment Status: retired     Employment/ Comments: \"No  benefits\"  Financial Concerns:     Referral to Financial Worker: No       Does the patient's insurance plan have a 3 day qualifying hospital stay waiver?  No    Lifestyle & Psychosocial Needs:  Social Determinants of Health     Tobacco Use: Not on file   Alcohol Use: Not on file   Financial Resource Strain: Not on " "file   Food Insecurity: Not on file   Transportation Needs: Not on file   Physical Activity: Not on file   Stress: Not on file   Social Connections: Not on file   Intimate Partner Violence: Not on file   Depression: Not at risk (2/22/2022)    PHQ-2      PHQ-2 Score: 0   Housing Stability: Not on file       Functional Status:  Prior to admission patient needed assistance:   Dependent ADLs:: Ambulation-cane, Independent  Dependent IADLs:: Independent       Mental Health Status:  Mental Health Status: Past Concern  Mental Health Management: Medication    Chemical Dependency Status:                Values/Beliefs:  Spiritual, Cultural Beliefs, Adventism Practices, Values that affect care:                 Additional Information:  Patricia lives in a house with her . She is independent with ADLs and IADLs at baseline.    \"I use the cane when I go out of the house or if I am moving around a lot inside the house. I also have lots of equipment from my Mom that I can use PRN. I have walkers, wheelchair, crutches, a scooter, a shower chair, and a toilet raiser with railings\".    Likely home with no new needs at this time.     to transport at discharge.    CM to follow for medical progression of care, discharge recommendations, and final discharge plan.    Patty Amos RN      "

## 2023-05-14 NOTE — H&P
[facility]    History and Physical - Hospitalist Service       Date of Admission:  5/13/2023    Assessment & Plan   Patricia Mcnamara is a 73 year old female with history of hypertension, restless leg syndrome, anxiety disorder, and hyperlipidemia admitted on 5/13/2023 with cough and shortness of breath.    CT showed mucoid impaction of a number of right middle lobe and lower lobe airways with scattered tree-in-bud nodular opacities throughout the right lower lobe, compatible with aspiration/infection and 1.4 cm likely cystic lesion of the pancreatic tail, possibly representing a small sidebranch IPMN for which MRI/MRCP is recommended    Acute hypoxic respiratory failure  Mucoid impaction in the right middle lobe  Suspected community-acquired pneumonia  Continue IV levofloxacin  Follow-up sputum culture  Follow-up consult to speech therapist for swallowing evaluation  Supplemental oxygen as needed    Tobacco use disorder  She smokes 1 pack of cigarettes daily  Nicotine patch  Smoking cessation counseling    Suspected IPMN of the pancreatic tail  CT showed 1.4 cm likely cystic lesion of the pancreatic tail, possibly representing a small sidebranch IPMN for which MRI/MRCP is recommended  She states cystic lesion in the pancreatic tail was diagnosed about 15 years ago.  Outpatient follow-up    Mild hypercalcemia  Calcium 10.4 on admission  Possibly secondary to fluid depletion  Normal saline 500 mL bolus    Acute COPD exacerbation  PCO2 51  Pulmonary CT angiogram showed mucoid impaction of a number of right middle lobe and lower lobe airways with scattered tree-in-bud nodular opacities throughout the right lower lobe, compatible with aspiration/infection and no evidence of pulmonary embolism.   Possibly triggered by pneumonia    DuoNebs as scheduled  Continue steroid therapy  Supplemental oxygen as needed  Continue antibiotic as above    Hypertension  Resume PTA lisinopril 10 mg  "daily    Hyperlipidemia  Resume PTA atorvastatin      Anxiety disorder  Resume PTA sertraline    Suspected opioid dependence  Continue PTA buprenorphine      Diet: Combination Diet Low Saturated Fat Na <2400mg Diet, No Caffeine Diet  DVT Prophylaxis: Pneumatic Compression Devices  Gibbs Catheter: Not present  Lines: None     Cardiac Monitoring: ACTIVE order. Indication: Pneumonia  Code Status: Full Code    Clinically Significant Risk Factors Present on Admission           # Hypercalcemia: Highest Ca = 10.4 mg/dL in last 2 days, will monitor as appropriate        # Hypertension: Home medication list includes antihypertensive(s)      # Overweight: Estimated body mass index is 26.71 kg/m  as calculated from the following:    Height as of this encounter: 1.689 m (5' 6.5\").    Weight as of this encounter: 76.2 kg (168 lb).            Disposition Plan      Expected Discharge Date: 05/16/2023                  Lan Montalvo MD  Hospitalist Service  [facility]  Securely message with [employer] (more info)  Text page via [facility] Paging/Directory     ______________________________________________________________________    Chief Complaint   Cough and shortness of breath    History is obtained from the patient    History of Present Illness   Patricia Mcnamara is a 73 year old female with history of hypertension, restless leg syndrome, anxiety disorder, and hyperlipidemia who presents to the ER due to cough and shortness of breath.    She was in her usual state of health until 3 days ago when she developed cough associated with shortness of breath.  Cough is productive of whitish sputum.  She denies fever, chills, nausea or vomiting.  She presented to urgent care center where she was found to be tachycardic and hypoxic with oxygen saturation in the low 80s.  She was not on home oxygen PTA.  She was subsequently referred to the ER for pulmonary CT angiogram to rule out pulmonary embolism.    She smokes 1 pack " of cigarettes daily.  She is allergic to cephalosporins, NSAIDs and sulfa medications.  She states cystic lesion in the pancreatic tail was diagnosed about 15 years ago.    Initial blood pressure was 154/90, pulse 104, respiratory rate 17, temperature 98.7  F, and oxygen saturation of 93% on 4 L of intranasal oxygen.  Notable laboratory findings include calcium 10.4, , AST 40, PCO2 51, and WBC 11.4.  Rapid COVID-19 test was negative.  Pulmonary CT angiogram showed mucoid impaction of a number of right middle lobe and lower lobe airways with scattered tree-in-bud nodular opacities throughout the right lower lobe, compatible with aspiration/infection, 1.4 cm likely cystic lesion of the pancreatic tail, possibly representing a small sidebranch IPMN for which MRI/MRCP is recommended, moderate coronary artery calcification and no evidence of pulmonary embolism.       She received DuoNeb, methylprednisolone and levofloxacin in the ER.  She wants to be full code.      Past Medical History    History reviewed. No pertinent past medical history.    Past Surgical History   History reviewed. No pertinent surgical history.    Prior to Admission Medications   Prior to Admission Medications   Prescriptions Last Dose Informant Patient Reported? Taking?   Buprenorphine HCl (BELBUCA) 600 MCG FILM buccal film 5/13/2023 at 1200  Yes Yes   Sig: Place 600 mcg inside cheek every 12 hours   LORazepam (ATIVAN) 0.5 MG tablet   Yes Yes   Sig: Take 0.25 mg by mouth every 8 hours as needed   atorvastatin (LIPITOR) 80 MG tablet 5/12/2023  Yes Yes   Sig: Take 80 mg by mouth daily   folic acid (FOLVITE) 1 MG tablet 5/12/2023  Yes Yes   Sig: Take 1 mg by mouth daily   lisinopril (ZESTRIL) 10 MG tablet 5/13/2023  Yes Yes   Sig: Take 10 mg by mouth daily   naproxen (NAPROSYN) 500 MG tablet   Yes Yes   Sig: Take 500 mg by mouth 2 times daily as needed for moderate pain   nystatin (MYCOSTATIN) 410276 UNIT/GM external cream   Yes Yes   Sig:  daily as needed   oxyCODONE IR (ROXICODONE) 10 MG tablet 5/13/2023 at am  Yes Yes   Sig: Take 5 mg by mouth every 6 hours as needed   sertraline (ZOLOFT) 100 MG tablet 5/12/2023  Yes Yes   Sig: Take 150 mg by mouth daily      Facility-Administered Medications: None        Review of Systems    The 10 point Review of Systems is negative other than noted in the HPI or here.   Physical Exam   Vital Signs: Temp: 98.7  F (37.1  C) Temp src: Oral BP: (!) 154/92 Pulse: 108   Resp: 22 SpO2: 94 % O2 Device: Nasal cannula Oxygen Delivery: 4 LPM  Weight: 168 lbs 0 oz    General appearance: Awake, Alert, Cooperative, not in any obvious distress and appears stated age   HEENT: Normocephalic, atraumatic, conjunctiva clear without icterus and ears without discharge  Lungs: Clear to auscultation bilaterally, no wheezing, good air exchange, normal work of breathing  Cardiovascular: Regular Rate and Rythm, normal apical impulse, normal S1 and S2, no lower extremity edema bilaterally  Abdomen: Soft, non-tender and Non-distended, active bowel sounds  Skin: Skin color, texture normal and bruising or bleeding. No rashes or lesions over face, neck, arms and legs, turgor normal.  Musculoskeletal: No bony deformities or joint tenderness. Normal ROM upon flexion & extension.   Neurologic: Alert & Oriented X 3, Facial symmetry preserved and upper & lower extremities moving well with symmetry  Psychiatric: Calm, normal eye contact and normal affect      Medical Decision Making       60 MINUTES SPENT BY ME on the date of service doing chart review, history, exam, documentation & further activities per the note.      Data   Imaging results reviewed over the past 24 hrs:   Recent Results (from the past 24 hour(s))   XR CHEST 2 VIEWS PA AND LATERAL    Narrative    For Patients: As a result of the 21st Century Cures Act, medical imaging exams and procedure reports are released immediately into your electronic medical record. You may view this report  before your referring provider. If you have questions, please contact your health care provider.    EXAM: XR CHEST 2 VIEWS PA AND LATERAL  LOCATION: [facility]  DATE/TIME: 5/13/2023 8:39 PM CDT    INDICATION: BREATHING PROBLEM  COMPARISON: None.    Impression    Heart size and pulmonary vessels normal. Slight opacities at both apices most suggestive of pleural thickening. Lungs otherwise clear. Diffuse osteopenia.   CT Chest Pulmonary Embolism w Contrast    Narrative    EXAM: CT CHEST PULMONARY EMBOLISM W CONTRAST  LOCATION: [facility]  DATE/TIME: 5/13/2023 10:43 PM CDT    INDICATION: hypoxia, shortness of breath, elevated d dimer at [facility], eval for PE, etc  COMPARISON: Same day chest radiograph.  TECHNIQUE: CT chest pulmonary angiogram during arterial phase injection of IV contrast. Multiplanar reformats and MIP reconstructions were performed. Dose reduction techniques were used.   CONTRAST: Isovue 370 100 mL    FINDINGS:  ANGIOGRAM CHEST: Pulmonary arteries are normal caliber and negative for pulmonary emboli. Thoracic aorta is negative for dissection. No CT evidence of right heart strain.    LUNGS AND PLEURA: Centrilobular emphysematous changes. Mucoid impaction of a number of right middle lobe and lower lobe airways with scattered tree-in-bud nodular opacities throughout the right lower lobe, compatible with aspiration/infection.    MEDIASTINUM/AXILLAE: Mild atherosclerotic calcifications of the aortic arch and descending thoracic aorta. Shotty mediastinal and hilar nodes likely reactive.    CORONARY ARTERY CALCIFICATION: Moderate.    UPPER ABDOMEN: Somewhat atrophic pancreas. Likely cystic appearing lesion of the pancreatic tail, though not well evaluated. This measures 1.4 cm (series 4 image 278) and may represent a sidebranch IPMN. Small left renal cyst requiring no dedicated   follow-up. Small nonobstructing renal calyceal calculi of the left kidney  measuring up to 5 mm in the lower pole. Atherosclerotic calcifications of the partially visualized upper abdominal aorta.    MUSCULOSKELETAL: Multilevel mild wedge deformities of thoracic spine likely chronic and resulting in a slightly exaggerated thoracic kyphosis. There are also multilevel degenerative changes of the thoracic spine. No definite acute osseous abnormality or   suspicious bony lesion.      Impression    IMPRESSION:  1.  No pulmonary embolus.  2.  Mucoid impaction of a number of right middle lobe and lower lobe airways with scattered tree-in-bud nodular opacities throughout the right lower lobe, compatible with aspiration/infection.  3.  Incidentally noted is a 1.4 cm likely cystic lesion of the pancreatic tail, possibly representing a small sidebranch IPMN. Recommend MRI/MRCP for further evaluation which may be performed on a nonemergent outpatient basis.

## 2023-05-14 NOTE — PROGRESS NOTES
"[facility]    Medicine Progress Note - Hospitalist Service    Date of Admission:  5/13/2023    Assessment & Plan   Patient is a 73 year old female with history of hypertension and tobacco use admitted 5/13/2023 with pneumonia and COPD exacerbation. Patient smokes 1 pack per day but no prior diagnosis of COPD.     #Acute hypoxic respiratory failure  #Pneumonia  CTA showed no PE  Continue IV levofloxacin  Follow-up sputum culture  Wean O2 as tolerated  IS, flutter valve    #COPD exacerbation (COPD is a new Dx for patient)  Will need formal PFTs as outpatient once pneumonia is resolved  IV solu-medrol  Bronchodilators  Will need home inhaler at discharge  Will need pulm f/u    #Tobacco use disorder  1 pack of cigarettes daily, not interested in quitting  Nicotine patch    #Pancreatic tail cystic lesion, known  Outpatient follow-up    #Mild hypercalcemia  Likely 2/2 mild dehydration  Given IV fluids in ED  Follow up Ca level in AM    #Hypertension  Continue PTA lisinopril     #Opioid dependence  Continue PTA buprenorphine      DVT ppx: PCDs      Diet: Combination Diet Low Saturated Fat Na <2400mg Diet, No Caffeine Diet  DVT Prophylaxis: Pneumatic Compression Devices  Gibbs Catheter: Not present  Lines: None     Cardiac Monitoring: ACTIVE order. Indication: Pneumonia  Code Status: Full Code         Diet: Combination Diet Low Saturated Fat Na <2400mg Diet, No Caffeine Diet    DVT Prophylaxis:   Gibbs Catheter: Not present  Lines: None     Cardiac Monitoring: ACTIVE order. Indication: Pneumonia  Code Status: Full Code      Clinically Significant Risk Factors Present on Admission           # Hypercalcemia: Highest Ca = 10.4 mg/dL in last 2 days, will monitor as appropriate        # Hypertension: Home medication list includes antihypertensive(s)      # Overweight: Estimated body mass index is 26.71 kg/m  as calculated from the following:    Height as of this encounter: 1.689 m (5' 6.5\").    Weight as " of this encounter: 76.2 kg (168 lb).            Disposition Plan      Expected Discharge Date: 05/16/2023      Destination: home with family            Ever Resendez, DO  Hospitalist Service  [facility]  Securely message with [employer] (more info)  Text page via [employer] Paging/Directory   ______________________________________________________________________    Interval History   No acute events overnight. Continues to need O2, weaned from 4L to 2L    Physical Exam   Vital Signs: Temp: 97.7  F (36.5  C) Temp src: Oral BP: 137/85 Pulse: 100   Resp: 22 SpO2: 93 % O2 Device: Nasal cannula Oxygen Delivery: 2 LPM  Weight: 168 lbs 0 oz    General Appearance:  No acute distress  Respiratory: No respiratory distress, wheezes and rhonchi auscultated bilaterally, right worse than left  Cardiovascular: Regular rate and rhythm  Extremities: No peripheral edema, cyanosis or clubbing      Medical Decision Making             Data     I have personally reviewed the following data over the past 24 hrs:    11.4 (H)  \   14.4   / 160     136 102 9.8 /  109 (H)   3.9 27 0.51 \       ALT: 33 AST: 40 (H) AP: 117 (H) TBILI: 0.3   ALB: 3.8 TOT PROTEIN: 7.2 LIPASE: N/A       Trop: 12 BNP: 831       Procal: N/A CRP: N/A Lactic Acid: 0.9         Imaging results reviewed over the past 24 hrs:   Recent Results (from the past 24 hour(s))   XR CHEST 2 VIEWS PA AND LATERAL    Narrative    For Patients: As a result of the 21st Century Cures Act, medical imaging exams and procedure reports are released immediately into your electronic medical record. You may view this report before your referring provider. If you have questions, please contact your health care provider.    EXAM: XR CHEST 2 VIEWS PA AND LATERAL  LOCATION: [facility] [place]  DATE/TIME: 5/13/2023 8:39 PM CDT    INDICATION: BREATHING PROBLEM  COMPARISON: None.    Impression    Heart size and pulmonary vessels normal. Slight opacities at both apices  most suggestive of pleural thickening. Lungs otherwise clear. Diffuse osteopenia.   CT Chest Pulmonary Embolism w Contrast    Narrative    EXAM: CT CHEST PULMONARY EMBOLISM W CONTRAST  LOCATION: [facility]  DATE/TIME: 5/13/2023 10:43 PM CDT    INDICATION: hypoxia, shortness of breath, elevated d dimer at [facility], eval for PE, etc  COMPARISON: Same day chest radiograph.  TECHNIQUE: CT chest pulmonary angiogram during arterial phase injection of IV contrast. Multiplanar reformats and MIP reconstructions were performed. Dose reduction techniques were used.   CONTRAST: Isovue 370 100 mL    FINDINGS:  ANGIOGRAM CHEST: Pulmonary arteries are normal caliber and negative for pulmonary emboli. Thoracic aorta is negative for dissection. No CT evidence of right heart strain.    LUNGS AND PLEURA: Centrilobular emphysematous changes. Mucoid impaction of a number of right middle lobe and lower lobe airways with scattered tree-in-bud nodular opacities throughout the right lower lobe, compatible with aspiration/infection.    MEDIASTINUM/AXILLAE: Mild atherosclerotic calcifications of the aortic arch and descending thoracic aorta. Shotty mediastinal and hilar nodes likely reactive.    CORONARY ARTERY CALCIFICATION: Moderate.    UPPER ABDOMEN: Somewhat atrophic pancreas. Likely cystic appearing lesion of the pancreatic tail, though not well evaluated. This measures 1.4 cm (series 4 image 278) and may represent a sidebranch IPMN. Small left renal cyst requiring no dedicated   follow-up. Small nonobstructing renal calyceal calculi of the left kidney measuring up to 5 mm in the lower pole. Atherosclerotic calcifications of the partially visualized upper abdominal aorta.    MUSCULOSKELETAL: Multilevel mild wedge deformities of thoracic spine likely chronic and resulting in a slightly exaggerated thoracic kyphosis. There are also multilevel degenerative changes of the thoracic spine. No definite acute osseous  abnormality or   suspicious bony lesion.      Impression    IMPRESSION:  1.  No pulmonary embolus.  2.  Mucoid impaction of a number of right middle lobe and lower lobe airways with scattered tree-in-bud nodular opacities throughout the right lower lobe, compatible with aspiration/infection.  3.  Incidentally noted is a 1.4 cm likely cystic lesion of the pancreatic tail, possibly representing a small sidebranch IPMN. Recommend MRI/MRCP for further evaluation which may be performed on a nonemergent outpatient basis.

## 2023-05-14 NOTE — TREATMENT PLAN
RCAT Treatment Plan    Patient Score: 13  Patient Acuity: 3    Clinical Indication for Therapy: productive cough, rhonchi on auscultation, history of mucous producing disease and prevent atelectasis    Therapy Ordered: Atrovent/Xopenex QID, Flutter QID, Incentive Spirometry per unit routine    Assessment Summary: PT currently on 3L NC with an SpO2 of 95%.  Not on home O2.  Frequent, congested, productive coughs.  BS coarse.  Encourage to cough up secretions and use IS.  Wean O2 to room air, when able. RT will re-evalutate RCAT in x72 hours if PT is still admitted.     Missael Escobar, RT  5/14/2023

## 2023-05-14 NOTE — ED NOTES
Expected Patient Referral to ED  8:52 PM    Referring Clinic/Provider:  [facility]    Reason for referral/Clinical facts:  73 F with hypoxia, tachycardia, SOB. Sats low 80s. Not on home O2. No change after nebs. Covid neg. D dimer elevated, needs CTPE.    Recommendations provided:  Send to ED for further evaluation    Caller was informed that this institution does possess the capabilities and/or resources to provide for patient and should be transferred to our facility.    Discussed that if direct admit is sought and any hurdles are encountered, this ED would be happy to see the patient and evaluate.    Informed caller that recommendations provided are recommendations based only on the facts provided and that they responsible to accept or reject the advice, or to seek a formal in person consultation as needed and that this ED will see/treat patient should they arrive.      Elin Cruz MD  [facility] EMERGENCY DEPARTMENT  [address] [place] [id number]  [phone]       Elin Cruz MD  05/13/23 [id number]

## 2023-05-14 NOTE — ED NOTES
Bed: JNED-05  Expected date: 5/13/23  Expected time: 9:04 PM  Means of arrival: Ambulance  Comments:  Farzana LO

## 2023-05-15 VITALS
WEIGHT: 171.6 LBS | HEIGHT: 66 IN | TEMPERATURE: 98 F | OXYGEN SATURATION: 92 % | HEART RATE: 94 BPM | DIASTOLIC BLOOD PRESSURE: 93 MMHG | SYSTOLIC BLOOD PRESSURE: 158 MMHG | RESPIRATION RATE: 19 BRPM | BODY MASS INDEX: 27.58 KG/M2

## 2023-05-15 LAB
ANION GAP SERPL CALCULATED.3IONS-SCNC: 10 MMOL/L (ref 7–15)
BASOPHILS # BLD AUTO: 0 10E3/UL (ref 0–0.2)
BASOPHILS NFR BLD AUTO: 0 %
BUN SERPL-MCNC: 12.8 MG/DL (ref 8–23)
CALCIUM SERPL-MCNC: 10.8 MG/DL (ref 8.8–10.2)
CHLORIDE SERPL-SCNC: 103 MMOL/L (ref 98–107)
CREAT SERPL-MCNC: 0.46 MG/DL (ref 0.51–0.95)
DEPRECATED HCO3 PLAS-SCNC: 27 MMOL/L (ref 22–29)
EOSINOPHIL # BLD AUTO: 0 10E3/UL (ref 0–0.7)
EOSINOPHIL NFR BLD AUTO: 0 %
ERYTHROCYTE [DISTWIDTH] IN BLOOD BY AUTOMATED COUNT: 15.8 % (ref 10–15)
GFR SERPL CREATININE-BSD FRML MDRD: >90 ML/MIN/1.73M2
GLUCOSE SERPL-MCNC: 128 MG/DL (ref 70–99)
HCT VFR BLD AUTO: 46.5 % (ref 35–47)
HGB BLD-MCNC: 14.8 G/DL (ref 11.7–15.7)
IMM GRANULOCYTES # BLD: 0.1 10E3/UL
IMM GRANULOCYTES NFR BLD: 1 %
LYMPHOCYTES # BLD AUTO: 1.5 10E3/UL (ref 0.8–5.3)
LYMPHOCYTES NFR BLD AUTO: 10 %
MCH RBC QN AUTO: 28.6 PG (ref 26.5–33)
MCHC RBC AUTO-ENTMCNC: 31.8 G/DL (ref 31.5–36.5)
MCV RBC AUTO: 90 FL (ref 78–100)
MONOCYTES # BLD AUTO: 0.7 10E3/UL (ref 0–1.3)
MONOCYTES NFR BLD AUTO: 5 %
NEUTROPHILS # BLD AUTO: 12.7 10E3/UL (ref 1.6–8.3)
NEUTROPHILS NFR BLD AUTO: 84 %
NRBC # BLD AUTO: 0 10E3/UL
NRBC BLD AUTO-RTO: 0 /100
PLATELET # BLD AUTO: 198 10E3/UL (ref 150–450)
POTASSIUM SERPL-SCNC: 4 MMOL/L (ref 3.4–5.3)
RBC # BLD AUTO: 5.17 10E6/UL (ref 3.8–5.2)
SODIUM SERPL-SCNC: 140 MMOL/L (ref 136–145)
WBC # BLD AUTO: 15.1 10E3/UL (ref 4–11)

## 2023-05-15 PROCEDURE — 80048 BASIC METABOLIC PNL TOTAL CA: CPT | Performed by: HOSPITALIST

## 2023-05-15 PROCEDURE — 94640 AIRWAY INHALATION TREATMENT: CPT

## 2023-05-15 PROCEDURE — 258N000003 HC RX IP 258 OP 636: Performed by: HOSPITALIST

## 2023-05-15 PROCEDURE — 94640 AIRWAY INHALATION TREATMENT: CPT | Mod: 76

## 2023-05-15 PROCEDURE — 250N000011 HC RX IP 250 OP 636: Performed by: HOSPITALIST

## 2023-05-15 PROCEDURE — 999N000157 HC STATISTIC RCP TIME EA 10 MIN

## 2023-05-15 PROCEDURE — 250N000009 HC RX 250: Performed by: HOSPITALIST

## 2023-05-15 PROCEDURE — 85014 HEMATOCRIT: CPT | Performed by: HOSPITALIST

## 2023-05-15 PROCEDURE — 250N000013 HC RX MED GY IP 250 OP 250 PS 637: Performed by: INTERNAL MEDICINE

## 2023-05-15 PROCEDURE — 250N000013 HC RX MED GY IP 250 OP 250 PS 637: Performed by: HOSPITALIST

## 2023-05-15 PROCEDURE — 99239 HOSP IP/OBS DSCHRG MGMT >30: CPT | Performed by: HOSPITALIST

## 2023-05-15 PROCEDURE — 250N000013 HC RX MED GY IP 250 OP 250 PS 637: Performed by: EMERGENCY MEDICINE

## 2023-05-15 PROCEDURE — 94799 UNLISTED PULMONARY SVC/PX: CPT

## 2023-05-15 PROCEDURE — 36415 COLL VENOUS BLD VENIPUNCTURE: CPT | Performed by: HOSPITALIST

## 2023-05-15 RX ORDER — PREDNISONE 50 MG/1
50 TABLET ORAL DAILY
Qty: 4 TABLET | Refills: 0 | Status: SHIPPED | OUTPATIENT
Start: 2023-05-15 | End: 2023-10-08

## 2023-05-15 RX ORDER — NALOXONE HYDROCHLORIDE 0.4 MG/ML
0.4 INJECTION, SOLUTION INTRAMUSCULAR; INTRAVENOUS; SUBCUTANEOUS
Status: DISCONTINUED | OUTPATIENT
Start: 2023-05-15 | End: 2023-05-15 | Stop reason: HOSPADM

## 2023-05-15 RX ORDER — GUAIFENESIN 600 MG/1
1200 TABLET, EXTENDED RELEASE ORAL 2 TIMES DAILY
Qty: 20 TABLET | Refills: 1 | Status: SHIPPED | OUTPATIENT
Start: 2023-05-15 | End: 2023-10-08

## 2023-05-15 RX ORDER — NALOXONE HYDROCHLORIDE 0.4 MG/ML
0.2 INJECTION, SOLUTION INTRAMUSCULAR; INTRAVENOUS; SUBCUTANEOUS
Status: DISCONTINUED | OUTPATIENT
Start: 2023-05-15 | End: 2023-05-15 | Stop reason: HOSPADM

## 2023-05-15 RX ORDER — ALBUTEROL SULFATE 90 UG/1
2 AEROSOL, METERED RESPIRATORY (INHALATION) EVERY 4 HOURS PRN
Qty: 18 G | Refills: 11 | Status: SHIPPED | OUTPATIENT
Start: 2023-05-15

## 2023-05-15 RX ORDER — LEVOFLOXACIN 750 MG/1
750 TABLET, FILM COATED ORAL DAILY
Qty: 5 TABLET | Refills: 0 | Status: SHIPPED | OUTPATIENT
Start: 2023-05-15 | End: 2023-10-08

## 2023-05-15 RX ORDER — SODIUM CHLORIDE 9 MG/ML
INJECTION, SOLUTION INTRAVENOUS CONTINUOUS
Status: DISCONTINUED | OUTPATIENT
Start: 2023-05-15 | End: 2023-05-15 | Stop reason: HOSPADM

## 2023-05-15 RX ADMIN — LISINOPRIL 10 MG: 5 TABLET ORAL at 08:40

## 2023-05-15 RX ADMIN — LEVALBUTEROL HYDROCHLORIDE 1.25 MG: 1.25 SOLUTION RESPIRATORY (INHALATION) at 12:42

## 2023-05-15 RX ADMIN — IPRATROPIUM BROMIDE 0.5 MG: 0.5 SOLUTION RESPIRATORY (INHALATION) at 12:42

## 2023-05-15 RX ADMIN — ACETAMINOPHEN, ASPIRIN, CAFFEINE 2 TABLET: 250; 65; 250 TABLET, FILM COATED ORAL at 14:14

## 2023-05-15 RX ADMIN — METHYLPREDNISOLONE SODIUM SUCCINATE 40 MG: 40 INJECTION INTRAMUSCULAR; INTRAVENOUS at 05:41

## 2023-05-15 RX ADMIN — GUAIFENESIN 1200 MG: 600 TABLET ORAL at 08:38

## 2023-05-15 RX ADMIN — LEVALBUTEROL HYDROCHLORIDE 1.25 MG: 1.25 SOLUTION RESPIRATORY (INHALATION) at 15:10

## 2023-05-15 RX ADMIN — NICOTINE 1 PATCH: 21 PATCH, EXTENDED RELEASE TRANSDERMAL at 08:43

## 2023-05-15 RX ADMIN — METHYLPREDNISOLONE SODIUM SUCCINATE 40 MG: 40 INJECTION INTRAMUSCULAR; INTRAVENOUS at 10:28

## 2023-05-15 RX ADMIN — IPRATROPIUM BROMIDE 0.5 MG: 0.5 SOLUTION RESPIRATORY (INHALATION) at 15:10

## 2023-05-15 RX ADMIN — LEVALBUTEROL HYDROCHLORIDE 1.25 MG: 1.25 SOLUTION RESPIRATORY (INHALATION) at 07:57

## 2023-05-15 RX ADMIN — SODIUM CHLORIDE: 9 INJECTION, SOLUTION INTRAVENOUS at 08:50

## 2023-05-15 RX ADMIN — IPRATROPIUM BROMIDE 0.5 MG: 0.5 SOLUTION RESPIRATORY (INHALATION) at 07:56

## 2023-05-15 ASSESSMENT — ACTIVITIES OF DAILY LIVING (ADL)
ADLS_ACUITY_SCORE: 18

## 2023-05-15 NOTE — PROGRESS NOTES
Care Management Follow Up    Length of Stay (days): 1    Expected Discharge Date: 05/16/2023     Concerns to be Addressed:  IV ABX     Patient plan of care discussed at interdisciplinary rounds: Yes    Anticipated Discharge Disposition:  TBD     Anticipated Discharge Services:    Anticipated Discharge DME:      Patient/family educated on Medicare website which has current facility and service quality ratings:    Education Provided on the Discharge Plan:    Patient/Family in Agreement with the Plan:      Referrals Placed by CM/SW:  None at this time  Private pay costs discussed: Not applicable    Additional Information:  Social history:  Pt lives in a house with her . She is indepndent with ADLs and IADLs. Uses a cane for mobility.  to transport at discharge.    No therapy recs    Pt remains on IV ABX CM cont to follow for medical progression, recommendations and final discharge plans.      Care Management Discharge Note    Discharge Date: 05/15/2023       Discharge Disposition:      Discharge Services:      Discharge DME:      Discharge Transportation: family or friend will provide    Private pay costs discussed: Not applicable    Does the patient's insurance plan have a 3 day qualifying hospital stay waiver?  No    PAS Confirmation Code:    Patient/family educated on Medicare website which has current facility and service quality ratings:      Education Provided on the Discharge Plan:    Persons Notified of Discharge Plans: yes  Patient/Family in Agreement with the Plan:      Handoff Referral Completed: Yes    Additional Information:  Pt medically ready to discharge no CM needs.        Heidi Henderson, RN          Heidi Henderson, RN

## 2023-05-15 NOTE — PROGRESS NOTES
"O2 3 lpm/NC, BS scattered rhonchi, Xopenex/Atrovent tx given/mask. Tolerated well. BS after uinchanged    BP (!) 174/98 (BP Location: Left arm)   Pulse 98   Temp 97.6  F (36.4  C) (Oral)   Resp 18   Ht 1.676 m (5' 6\")   Wt 77.8 kg (171 lb 9.6 oz)   SpO2 93%   BMI 27.70 kg/m      RT to monitor  "

## 2023-05-15 NOTE — PLAN OF CARE
Patient a&o x4, 3L O2, on atb, steroid, breathing treatment for copd exacs. Up ad chandni, Normal sinus/ tachy on the monitor.

## 2023-05-15 NOTE — DISCHARGE SUMMARY
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    Weight as of this encounter: 77.8 kg (171 lb 9.6 oz).       Follow-ups Needed After Discharge   Follow-up Appointments     Follow-up and recommended labs and tests       Follow up with primary care provider, within 7 days for hospital follow-   up.  The following labs/tests are recommended: Pulmonary Function Testing   (PFTs) in 2 weeks and Chest Xray in 1 month.         Have blood draw with BMP to follow up Calcium level    Unresulted Labs Ordered in the Past 30 Days of this Admission     Date and Time Order Name Status Description    5/14/2023  3:32 AM Respiratory Aerobic Bacterial Culture with Gram Stain Preliminary     5/13/2023 11:23 PM Blood Culture Arm, Right Preliminary     5/13/2023 11:23 PM Blood Culture Arm, Right Preliminary       These results will be followed up by [facility]    Discharge Disposition   Discharged to home  Condition at discharge: Stable    Hospital Course   Patient is a 73 year old female with history of hypertension and tobacco use admitted 5/13/2023 with pneumonia and COPD exacerbation. Patient smokes 1 pack per day but no prior diagnosis of COPD.     #Acute hypoxic respiratory failure  #Pneumonia  CTA showed no PE  Received 2 doses IV Levaquin, continue 5 more days PO Levaquin at home  Follow-up sputum culture  O2 weaned off    #COPD exacerbation (COPD is a new Dx for " patient)  Will need formal PFTs as outpatient once pneumonia is resolved ~2 weeks  S/p 2 days IV solu-medrol, will complete 3 more days Prednisone at home  Albuterol inhaler  Pulm Clinic f/u    #Tobacco use disorder  1 pack of cigarettes daily, not interested in quitting  Nicotine patch    #Pancreatic tail cystic lesion, known  Outpatient follow-up    #Mild hypercalcemia  Likely 2/2 mild dehydration  Given IV fluids in ED  Follow up Ca level     #Hypertension  Continue PTA lisinopril     #Opioid dependence  Continue PTA buprenorphine      DVT ppx: PCDs      Diet: Combination Diet Low Saturated Fat Na <2400mg Diet, No Caffeine Diet  DVT Prophylaxis: Pneumatic Compression Devices  Gibbs Catheter: Not present  Lines: None     Cardiac Monitoring: ACTIVE order. Indication: Pneumonia  Code Status: Full Code        Consultations This Hospital Stay   PHYSICAL THERAPY ADULT IP CONSULT  OCCUPATIONAL THERAPY ADULT IP CONSULT  RESPIRATORY CARE IP CONSULT  CARE MANAGEMENT / SOCIAL WORK IP CONSULT    Code Status   Full Code    Time Spent on this Encounter   IEver DO, personally saw the patient today and spent greater than 30 minutes discharging this patient.       Ever Resendez DO  [place] [id number]  Phone: [phone]  Fax: [phone]  ______________________________________________________________________    Physical Exam   Vital Signs: Temp: 98  F (36.7  C) Temp src: Oral BP: (!) 158/93 (RN Notified) Pulse: 94   Resp: 19 SpO2: 90 % O2 Device: None (Room air) Oxygen Delivery: 1 LPM  Weight: 171 lbs 9.6 oz  General Appearance:  No acute distress  Respiratory: No respiratory distress, few rhonchi bilaterally  Cardiovascular: Regular rate and rhythm  Extremities: No peripheral edema, cyanosis or clubbing       Primary Care Physician   Physician No Ref-Primary    Discharge Orders      Reason for your hospital stay    Pneumonia and Exacerbation of  Chronic Obstructive Pulmonary Disease (COPD)     Follow-up and recommended labs and tests     Follow up with primary care provider, within 7 days for hospital follow- up.  The following labs/tests are recommended: Pulmonary Function Testing (PFTs) in 2 weeks and Chest Xray in 1 month.     Activity    Your activity upon discharge: activity as tolerated     Discharge Instructions    Call and make appointment with the Lung Clinic: [phone]     General PFT Lab (Please always keep checked)     Pulmonary Function Test    .     Diet    Follow this diet upon discharge: Orders Placed This Encounter      Combination Diet Low Saturated Fat Na <2400mg Diet       Significant Results and Procedures   Most Recent 3 CBC's:Recent Labs   Lab Test 05/15/23  0603 05/13/23  2145   WBC 15.1* 11.4*   HGB 14.8 14.4   MCV 90 90    160     Most Recent 3 BMP's:Recent Labs   Lab Test 05/15/23  0603 05/13/23  2145    136   POTASSIUM 4.0 3.9   CHLORIDE 103 102   CO2 27 27   BUN 12.8 9.8   CR 0.46* 0.51   ANIONGAP 10 7   MADDIE 10.8* 10.4*   * 109*     Most Recent 2 LFT's:Recent Labs   Lab Test 05/13/23  2145   AST 40*   ALT 33   ALKPHOS 117*   BILITOTAL 0.3   ,   Results for orders placed or performed during the hospital encounter of 05/13/23   CT Chest Pulmonary Embolism w Contrast    Narrative    EXAM: CT CHEST PULMONARY EMBOLISM W CONTRAST  LOCATION: [facility]  DATE/TIME: 5/13/2023 10:43 PM CDT    INDICATION: hypoxia, shortness of breath, elevated d dimer at Urgency room, eval for PE, etc  COMPARISON: Same day chest radiograph.  TECHNIQUE: CT chest pulmonary angiogram during arterial phase injection of IV contrast. Multiplanar reformats and MIP reconstructions were performed. Dose reduction techniques were used.   CONTRAST: Isovue 370 100 mL    FINDINGS:  ANGIOGRAM CHEST: Pulmonary arteries are normal caliber and negative for pulmonary emboli. Thoracic aorta is negative for dissection. No CT  evidence of right heart strain.    LUNGS AND PLEURA: Centrilobular emphysematous changes. Mucoid impaction of a number of right middle lobe and lower lobe airways with scattered tree-in-bud nodular opacities throughout the right lower lobe, compatible with aspiration/infection.    MEDIASTINUM/AXILLAE: Mild atherosclerotic calcifications of the aortic arch and descending thoracic aorta. Shotty mediastinal and hilar nodes likely reactive.    CORONARY ARTERY CALCIFICATION: Moderate.    UPPER ABDOMEN: Somewhat atrophic pancreas. Likely cystic appearing lesion of the pancreatic tail, though not well evaluated. This measures 1.4 cm (series 4 image 278) and may represent a sidebranch IPMN. Small left renal cyst requiring no dedicated   follow-up. Small nonobstructing renal calyceal calculi of the left kidney measuring up to 5 mm in the lower pole. Atherosclerotic calcifications of the partially visualized upper abdominal aorta.    MUSCULOSKELETAL: Multilevel mild wedge deformities of thoracic spine likely chronic and resulting in a slightly exaggerated thoracic kyphosis. There are also multilevel degenerative changes of the thoracic spine. No definite acute osseous abnormality or   suspicious bony lesion.      Impression    IMPRESSION:  1.  No pulmonary embolus.  2.  Mucoid impaction of a number of right middle lobe and lower lobe airways with scattered tree-in-bud nodular opacities throughout the right lower lobe, compatible with aspiration/infection.  3.  Incidentally noted is a 1.4 cm likely cystic lesion of the pancreatic tail, possibly representing a small sidebranch IPMN. Recommend MRI/MRCP for further evaluation which may be performed on a nonemergent outpatient basis.       Discharge Medications   Current Discharge Medication List      START taking these medications    Details   albuterol (PROAIR HFA/PROVENTIL HFA/VENTOLIN HFA) 108 (90 Base) MCG/ACT inhaler Inhale 2 puffs into the lungs every 4 hours as needed for  shortness of breath, wheezing or cough  Qty: 18 g, Refills: 11    Comments: Pharmacy may dispense brand covered by insurance (Proair, or proventil or ventolin or generic albuterol inhaler)  Associated Diagnoses: COPD exacerbation (H)      guaiFENesin (MUCINEX) 600 MG 12 hr tablet Take 2 tablets (1,200 mg) by mouth 2 times daily  Qty: 20 tablet, Refills: 1    Associated Diagnoses: Pneumonia due to infectious organism, unspecified laterality, unspecified part of lung      levofloxacin (LEVAQUIN) 750 MG tablet Take 1 tablet (750 mg) by mouth daily  Qty: 5 tablet, Refills: 0    Associated Diagnoses: Pneumonia due to infectious organism, unspecified laterality, unspecified part of lung      predniSONE (DELTASONE) 50 MG tablet Take 1 tablet (50 mg) by mouth daily  Qty: 4 tablet, Refills: 0    Associated Diagnoses: COPD exacerbation (H)         CONTINUE these medications which have NOT CHANGED    Details   atorvastatin (LIPITOR) 80 MG tablet Take 80 mg by mouth daily      Buprenorphine HCl (BELBUCA) 600 MCG FILM buccal film Place 600 mcg inside cheek every 12 hours      folic acid (FOLVITE) 1 MG tablet Take 1 mg by mouth daily      lisinopril (ZESTRIL) 10 MG tablet Take 10 mg by mouth daily      LORazepam (ATIVAN) 0.5 MG tablet Take 0.25 mg by mouth every 8 hours as needed      naproxen (NAPROSYN) 500 MG tablet Take 500 mg by mouth 2 times daily as needed for moderate pain      nystatin (MYCOSTATIN) 687971 UNIT/GM external cream daily as needed      oxyCODONE IR (ROXICODONE) 10 MG tablet Take 5 mg by mouth every 6 hours as needed      sertraline (ZOLOFT) 100 MG tablet Take 150 mg by mouth daily           Allergies   Allergies   Allergen Reactions     Diclofenac Anaphylaxis     Ketorolac Anaphylaxis     Ketorolac Tromethamine Anaphylaxis     Sulfa Antibiotics Anaphylaxis     Cephradine      Other reaction(s): *Unknown     Morphine Nausea

## 2023-05-15 NOTE — PLAN OF CARE
Problem: Plan of Care - These are the overarching goals to be used throughout the patient stay.    Goal: Plan of Care Review  Description: The Plan of Care Review/Shift note should be completed every shift.  The Outcome Evaluation is a brief statement about your assessment that the patient is improving, declining, or no change.  This information will be displayed automatically on your shift note.  Outcome: Progressing   Goal Outcome Evaluation:       Needs plan of care discussed tomorrow with doctor. Has a lot of questions that need clarification.continue with IV. Steroids and antibiotic. Cardiac monitoring and O2 as needed.

## 2023-05-15 NOTE — PLAN OF CARE
PRIMARY DIAGNOSIS: PNEUMONIA  OUTPATIENT/OBSERVATION GOALS TO BE MET BEFORE DISCHARGE:  1. Dyspnea improved and O2 sats >88% on RA or back to baseline O2 levels: Yes   SpO2: 90 %, O2 Device: None (Room air)    2. Tolerating oral abx or appropriate plans made outpatient infusion: Yes    3. Vitals signs normal or return to baseline: Yes    4. Short term supplemental O2 needed with activity at home: No    5. Tolerate oral intake to maintain hydration: Yes    6. Return to near baseline physical activity: Yes    Discharge Planner Nurse   Safe discharge environment identified: Yes  Barriers to discharge: No       Entered by: Yanira Eisenberg RN 05/15/2023 2:02 PM     Please review provider order for any additional goals.   Nurse to notify provider when observation goals have been met and patient is ready for discharge.

## 2023-05-16 LAB
BACTERIA SPT CULT: NORMAL
GRAM STAIN RESULT: NORMAL

## 2023-05-17 ENCOUNTER — PATIENT OUTREACH (OUTPATIENT)
Dept: CARE COORDINATION | Facility: CLINIC | Age: 73
End: 2023-05-17
Payer: COMMERCIAL

## 2023-05-17 NOTE — PROGRESS NOTES
[facility] Contact  [facility]/Voicemail     Clinical Data: Transitional Care Management Outreach     Outreach attempted x 2.  Left message on patient's voicemail, providing [facility]'s 24/7 scheduling and nurse triage phone number [phone] ([phone]) for questions/concerns and/or to schedule an appt with an [facility] provider, if they do not have a PCP.      Plan:  [facility] will do no further outreaches at this time.       Alisa Ruiz  Community Health Worker  [facility], [facility]  [phone]      *Connected Care Resource Team does NOT follow patient ongoing. Referrals are identified based on internal discharge reports and the outreach is to ensure patient has an understanding of their discharge instructions.

## 2023-05-18 LAB
ATRIAL RATE - MUSE: 110 BPM
DIASTOLIC BLOOD PRESSURE - MUSE: 92 MMHG
INTERPRETATION ECG - MUSE: NORMAL
P AXIS - MUSE: 70 DEGREES
PR INTERVAL - MUSE: 150 MS
QRS DURATION - MUSE: 120 MS
QT - MUSE: 352 MS
QTC - MUSE: 476 MS
R AXIS - MUSE: 78 DEGREES
SYSTOLIC BLOOD PRESSURE - MUSE: 139 MMHG
T AXIS - MUSE: 70 DEGREES
VENTRICULAR RATE- MUSE: 110 BPM

## 2023-05-19 LAB
BACTERIA BLD CULT: NO GROWTH
BACTERIA BLD CULT: NO GROWTH

## 2023-06-22 ENCOUNTER — HOSPITAL ENCOUNTER (OUTPATIENT)
Dept: RADIOLOGY | Facility: HOSPITAL | Age: 73
Discharge: HOME | End: 2023-06-22
Attending: HOSPITALIST | Admitting: HOSPITALIST
Payer: COMMERCIAL

## 2023-06-22 DIAGNOSIS — J18.9 PNEUMONIA DUE TO INFECTIOUS ORGANISM, UNSPECIFIED LATERALITY, UNSPECIFIED PART OF LUNG: ICD-10-CM

## 2023-06-22 DIAGNOSIS — J44.9 CHRONIC OBSTRUCTIVE PULMONARY DISEASE, UNSPECIFIED COPD TYPE (H): ICD-10-CM

## 2023-06-22 DIAGNOSIS — J44.1 COPD EXACERBATION (H): ICD-10-CM

## 2023-06-22 PROCEDURE — 71046 X-RAY EXAM CHEST 2 VIEWS: CPT

## 2023-06-26 RX ORDER — ALBUTEROL SULFATE 0.83 MG/ML
2.5 SOLUTION RESPIRATORY (INHALATION) ONCE
Status: COMPLETED | OUTPATIENT
Start: 2023-06-27 | End: 2023-06-27

## 2023-06-27 ENCOUNTER — HOSPITAL ENCOUNTER (OUTPATIENT)
Dept: RESPIRATORY THERAPY | Facility: CLINIC | Age: 73
Discharge: HOME | End: 2023-06-27
Attending: HOSPITALIST | Admitting: HOSPITALIST
Payer: COMMERCIAL

## 2023-06-27 DIAGNOSIS — J44.9 COPD (CHRONIC OBSTRUCTIVE PULMONARY DISEASE) (H): Primary | ICD-10-CM

## 2023-06-27 DIAGNOSIS — J44.9 CHRONIC OBSTRUCTIVE PULMONARY DISEASE, UNSPECIFIED COPD TYPE (H): ICD-10-CM

## 2023-06-27 LAB — HGB BLD-MCNC: 13.4 G/DL (ref 11.7–15.7)

## 2023-06-27 PROCEDURE — 250N000009 HC RX 250: Performed by: HOSPITALIST

## 2023-06-27 PROCEDURE — 85018 HEMOGLOBIN: CPT | Performed by: HOSPITALIST

## 2023-06-27 PROCEDURE — 94060 EVALUATION OF WHEEZING: CPT

## 2023-06-27 PROCEDURE — 94726 PLETHYSMOGRAPHY LUNG VOLUMES: CPT

## 2023-06-27 PROCEDURE — 999N000157 HC STATISTIC RCP TIME EA 10 MIN

## 2023-06-27 PROCEDURE — 36415 COLL VENOUS BLD VENIPUNCTURE: CPT | Performed by: HOSPITALIST

## 2023-06-27 PROCEDURE — 94729 DIFFUSING CAPACITY: CPT

## 2023-06-27 RX ADMIN — ALBUTEROL SULFATE 2.5 MG: 2.5 SOLUTION RESPIRATORY (INHALATION) at 12:59

## 2023-06-27 NOTE — PROGRESS NOTES
RESPIRATORY CARE NOTE    Complete Pulmonary Function Test completed by patient.  Good patient effort and cooperation. Albuterol 2.5 mg neb given for bronchodilation.  The results of this test meet the ATS standards for acceptability and repeatability. PT returned to baseline and left in no distress.    Liz Munguia, RT  6/27/2023

## 2023-06-29 LAB
DLCOCOR-%PRED-PRE: 70 %
DLCOCOR-PRE: 13.89 ML/MIN/MMHG
DLCOUNC-%PRED-PRE: 70 %
DLCOUNC-PRE: 13.89 ML/MIN/MMHG
DLCOUNC-PRED: 19.8 ML/MIN/MMHG
ERV-%PRED-PRE: 48 %
ERV-PRE: 0.37 L
ERV-PRED: 0.77 L
EXPTIME-PRE: 8.45 SEC
FEF2575-%PRED-POST: 49 %
FEF2575-%PRED-PRE: 33 %
FEF2575-POST: 0.88 L/SEC
FEF2575-PRE: 0.61 L/SEC
FEF2575-PRED: 1.78 L/SEC
FEFMAX-%PRED-PRE: 61 %
FEFMAX-PRE: 3.57 L/SEC
FEFMAX-PRED: 5.8 L/SEC
FEV1-%PRED-PRE: 58 %
FEV1-PRE: 1.26 L
FEV1FEV6-PRE: 60 %
FEV1FEV6-PRED: 79 %
FEV1FVC-PRE: 58 %
FEV1FVC-PRED: 78 %
FEV1SVC-PRE: 68 %
FEV1SVC-PRED: 69 %
FIFMAX-PRE: 2.62 L/SEC
FRCPLETH-%PRED-PRE: 133 %
FRCPLETH-PRE: 4.06 L
FRCPLETH-PRED: 3.03 L
FVC-%PRED-PRE: 78 %
FVC-PRE: 2.16 L
FVC-PRED: 2.75 L
IC-%PRED-PRE: 63 %
IC-PRE: 1.47 L
IC-PRED: 2.3 L
RVPLETH-%PRED-PRE: 172 %
RVPLETH-PRE: 3.68 L
RVPLETH-PRED: 2.14 L
TLCPLETH-%PRED-PRE: 104 %
TLCPLETH-PRE: 5.52 L
TLCPLETH-PRED: 5.31 L
VA-%PRED-PRE: 94 %
VA-PRE: 4.58 L
VC-%PRED-PRE: 59 %
VC-PRE: 1.84 L
VC-PRED: 3.12 L

## 2023-10-07 ENCOUNTER — HOSPITAL ENCOUNTER (OUTPATIENT)
Facility: HOSPITAL | Age: 73
Setting detail: OBSERVATION
Discharge: HOME | End: 2023-10-08
Attending: EMERGENCY MEDICINE | Admitting: HOSPITALIST
Payer: COMMERCIAL

## 2023-10-07 DIAGNOSIS — I47.21 TORSADES DE POINTES (H): ICD-10-CM

## 2023-10-07 DIAGNOSIS — U07.1 COVID-19 VIRUS INFECTION: ICD-10-CM

## 2023-10-07 DIAGNOSIS — R10.13 ABDOMINAL PAIN, EPIGASTRIC: ICD-10-CM

## 2023-10-07 PROBLEM — J44.9 CHRONIC OBSTRUCTIVE PULMONARY DISEASE (H): Status: ACTIVE | Noted: 2023-09-27

## 2023-10-07 LAB
ALBUMIN SERPL BCG-MCNC: 4 G/DL (ref 3.5–5.2)
ALP SERPL-CCNC: 137 U/L (ref 35–104)
ALT SERPL W P-5'-P-CCNC: 37 U/L (ref 0–50)
ANION GAP SERPL CALCULATED.3IONS-SCNC: 11 MMOL/L (ref 7–15)
AST SERPL W P-5'-P-CCNC: 40 U/L (ref 0–45)
BASE EXCESS BLDV CALC-SCNC: 3.5 MMOL/L
BASO+EOS+MONOS # BLD AUTO: ABNORMAL 10*3/UL
BASO+EOS+MONOS NFR BLD AUTO: ABNORMAL %
BASOPHILS # BLD AUTO: 0 10E3/UL (ref 0–0.2)
BASOPHILS NFR BLD AUTO: 0 %
BILIRUB DIRECT SERPL-MCNC: <0.2 MG/DL (ref 0–0.3)
BILIRUB SERPL-MCNC: 0.3 MG/DL
BUN SERPL-MCNC: 7.8 MG/DL (ref 8–23)
CALCIUM SERPL-MCNC: 10.1 MG/DL (ref 8.8–10.2)
CHLORIDE SERPL-SCNC: 103 MMOL/L (ref 98–107)
CREAT SERPL-MCNC: 0.44 MG/DL (ref 0.51–0.95)
DEPRECATED HCO3 PLAS-SCNC: 24 MMOL/L (ref 22–29)
EGFRCR SERPLBLD CKD-EPI 2021: >90 ML/MIN/1.73M2
EOSINOPHIL # BLD AUTO: 0 10E3/UL (ref 0–0.7)
EOSINOPHIL NFR BLD AUTO: 0 %
ERYTHROCYTE [DISTWIDTH] IN BLOOD BY AUTOMATED COUNT: 15.1 % (ref 10–15)
GLUCOSE SERPL-MCNC: 156 MG/DL (ref 70–99)
HCO3 BLDV-SCNC: 29 MMOL/L (ref 24–30)
HCT VFR BLD AUTO: 44.1 % (ref 35–47)
HGB BLD-MCNC: 14 G/DL (ref 11.7–15.7)
HOLD SPECIMEN: NORMAL
IMM GRANULOCYTES # BLD: 0 10E3/UL
IMM GRANULOCYTES NFR BLD: 1 %
LACTATE SERPL-SCNC: 1.1 MMOL/L (ref 0.7–2)
LIPASE SERPL-CCNC: 17 U/L (ref 13–60)
LYMPHOCYTES # BLD AUTO: 0.3 10E3/UL (ref 0.8–5.3)
LYMPHOCYTES NFR BLD AUTO: 4 %
MAGNESIUM SERPL-MCNC: 1.8 MG/DL (ref 1.7–2.3)
MCH RBC QN AUTO: 28.2 PG (ref 26.5–33)
MCHC RBC AUTO-ENTMCNC: 31.7 G/DL (ref 31.5–36.5)
MCV RBC AUTO: 89 FL (ref 78–100)
MONOCYTES # BLD AUTO: 0.7 10E3/UL (ref 0–1.3)
MONOCYTES NFR BLD AUTO: 10 %
NEUTROPHILS # BLD AUTO: 5.8 10E3/UL (ref 1.6–8.3)
NEUTROPHILS NFR BLD AUTO: 85 %
NRBC # BLD AUTO: 0 10E3/UL
NRBC BLD AUTO-RTO: 0 /100
OXYHGB MFR BLDV: 82.5 % (ref 70–75)
PCO2 BLDV: 48 MM HG (ref 35–50)
PH BLDV: 7.38 [PH] (ref 7.35–7.45)
PLATELET # BLD AUTO: 226 10E3/UL (ref 150–450)
PO2 BLDV: 49 MM HG (ref 25–47)
POTASSIUM SERPL-SCNC: 3.9 MMOL/L (ref 3.4–5.3)
PROT SERPL-MCNC: 7.3 G/DL (ref 6.4–8.3)
RBC # BLD AUTO: 4.96 10E6/UL (ref 3.8–5.2)
SAO2 % BLDV: 85.2 % (ref 70–75)
SODIUM SERPL-SCNC: 138 MMOL/L (ref 135–145)
TROPONIN T SERPL HS-MCNC: 11 NG/L
TSH SERPL DL<=0.005 MIU/L-ACNC: 0.25 UIU/ML (ref 0.3–4.2)
WBC # BLD AUTO: 6.8 10E3/UL (ref 4–11)

## 2023-10-07 PROCEDURE — 83690 ASSAY OF LIPASE: CPT | Performed by: EMERGENCY MEDICINE

## 2023-10-07 PROCEDURE — 82805 BLOOD GASES W/O2 SATURATION: CPT | Performed by: EMERGENCY MEDICINE

## 2023-10-07 PROCEDURE — 83605 ASSAY OF LACTIC ACID: CPT | Performed by: EMERGENCY MEDICINE

## 2023-10-07 PROCEDURE — 84439 ASSAY OF FREE THYROXINE: CPT | Performed by: EMERGENCY MEDICINE

## 2023-10-07 PROCEDURE — 250N000011 HC RX IP 250 OP 636: Performed by: EMERGENCY MEDICINE

## 2023-10-07 PROCEDURE — 80053 COMPREHEN METABOLIC PANEL: CPT | Performed by: EMERGENCY MEDICINE

## 2023-10-07 PROCEDURE — 86140 C-REACTIVE PROTEIN: CPT | Performed by: HOSPITALIST

## 2023-10-07 PROCEDURE — 82248 BILIRUBIN DIRECT: CPT | Performed by: EMERGENCY MEDICINE

## 2023-10-07 PROCEDURE — 85379 FIBRIN DEGRADATION QUANT: CPT | Performed by: HOSPITALIST

## 2023-10-07 PROCEDURE — 84484 ASSAY OF TROPONIN QUANT: CPT | Performed by: EMERGENCY MEDICINE

## 2023-10-07 PROCEDURE — 83735 ASSAY OF MAGNESIUM: CPT | Performed by: EMERGENCY MEDICINE

## 2023-10-07 PROCEDURE — 93005 ELECTROCARDIOGRAM TRACING: CPT | Performed by: EMERGENCY MEDICINE

## 2023-10-07 PROCEDURE — 96374 THER/PROPH/DIAG INJ IV PUSH: CPT

## 2023-10-07 PROCEDURE — 96375 TX/PRO/DX INJ NEW DRUG ADDON: CPT

## 2023-10-07 PROCEDURE — 84100 ASSAY OF PHOSPHORUS: CPT | Performed by: HOSPITALIST

## 2023-10-07 PROCEDURE — 84443 ASSAY THYROID STIM HORMONE: CPT | Performed by: EMERGENCY MEDICINE

## 2023-10-07 PROCEDURE — 85004 AUTOMATED DIFF WBC COUNT: CPT | Performed by: STUDENT IN AN ORGANIZED HEALTH CARE EDUCATION/TRAINING PROGRAM

## 2023-10-07 PROCEDURE — 85610 PROTHROMBIN TIME: CPT | Performed by: HOSPITALIST

## 2023-10-07 PROCEDURE — 36415 COLL VENOUS BLD VENIPUNCTURE: CPT | Performed by: EMERGENCY MEDICINE

## 2023-10-07 PROCEDURE — 99285 EMERGENCY DEPT VISIT HI MDM: CPT | Mod: 25

## 2023-10-07 RX ORDER — FENTANYL CITRATE 50 UG/ML
50 INJECTION, SOLUTION INTRAMUSCULAR; INTRAVENOUS ONCE
Status: COMPLETED | OUTPATIENT
Start: 2023-10-08 | End: 2023-10-07

## 2023-10-07 RX ORDER — MAGNESIUM SULFATE HEPTAHYDRATE 40 MG/ML
2 INJECTION, SOLUTION INTRAVENOUS ONCE
Status: COMPLETED | OUTPATIENT
Start: 2023-10-07 | End: 2023-10-07

## 2023-10-07 RX ADMIN — MAGNESIUM SULFATE HEPTAHYDRATE 2 G: 40 INJECTION, SOLUTION INTRAVENOUS at 23:09

## 2023-10-07 RX ADMIN — FENTANYL CITRATE 50 MCG: 50 INJECTION, SOLUTION INTRAMUSCULAR; INTRAVENOUS at 23:41

## 2023-10-07 ASSESSMENT — ACTIVITIES OF DAILY LIVING (ADL): ADLS_ACUITY_SCORE: 35

## 2023-10-08 ENCOUNTER — APPOINTMENT (OUTPATIENT)
Dept: CT IMAGING | Facility: HOSPITAL | Age: 73
End: 2023-10-08
Attending: EMERGENCY MEDICINE
Payer: COMMERCIAL

## 2023-10-08 VITALS
DIASTOLIC BLOOD PRESSURE: 96 MMHG | TEMPERATURE: 98.4 F | BODY MASS INDEX: 27.6 KG/M2 | OXYGEN SATURATION: 93 % | WEIGHT: 171 LBS | HEART RATE: 115 BPM | SYSTOLIC BLOOD PRESSURE: 167 MMHG | RESPIRATION RATE: 28 BRPM

## 2023-10-08 PROBLEM — R10.13 ABDOMINAL PAIN, EPIGASTRIC: Status: ACTIVE | Noted: 2023-10-08

## 2023-10-08 PROBLEM — U07.1 COVID-19 VIRUS INFECTION: Status: ACTIVE | Noted: 2023-10-08

## 2023-10-08 PROBLEM — I47.21 TORSADES DE POINTES (H): Status: ACTIVE | Noted: 2023-10-08

## 2023-10-08 LAB
ALBUMIN UR-MCNC: NEGATIVE MG/DL
AMORPH CRY #/AREA URNS HPF: ABNORMAL /HPF
APPEARANCE UR: ABNORMAL
BACTERIA #/AREA URNS HPF: ABNORMAL /HPF
BILIRUB UR QL STRIP: NEGATIVE
COLOR UR AUTO: ABNORMAL
CRP SERPL-MCNC: 10.7 MG/L
D DIMER PPP FEU-MCNC: 1.49 UG/ML FEU (ref 0–0.5)
FLUAV RNA SPEC QL NAA+PROBE: NEGATIVE
FLUBV RNA RESP QL NAA+PROBE: NEGATIVE
GLUCOSE UR STRIP-MCNC: NEGATIVE MG/DL
HGB UR QL STRIP: NEGATIVE
INR PPP: 1.06 (ref 0.85–1.15)
KETONES UR STRIP-MCNC: 10 MG/DL
LEUKOCYTE ESTERASE UR QL STRIP: NEGATIVE
MUCOUS THREADS #/AREA URNS LPF: PRESENT /LPF
NITRATE UR QL: NEGATIVE
PH UR STRIP: 8 [PH] (ref 5–7)
PHOSPHATE SERPL-MCNC: 2.4 MG/DL (ref 2.5–4.5)
RBC URINE: 0 /HPF
RSV RNA SPEC NAA+PROBE: NEGATIVE
SARS-COV-2 RNA RESP QL NAA+PROBE: POSITIVE
SP GR UR STRIP: 1.02 (ref 1–1.03)
T4 FREE SERPL-MCNC: 1.05 NG/DL (ref 0.9–1.7)
TROPONIN T SERPL HS-MCNC: 10 NG/L
UROBILINOGEN UR STRIP-MCNC: <2 MG/DL
WBC URINE: 0 /HPF

## 2023-10-08 PROCEDURE — 87637 SARSCOV2&INF A&B&RSV AMP PRB: CPT | Performed by: HOSPITALIST

## 2023-10-08 PROCEDURE — 71275 CT ANGIOGRAPHY CHEST: CPT

## 2023-10-08 PROCEDURE — 250N000013 HC RX MED GY IP 250 OP 250 PS 637: Performed by: HOSPITALIST

## 2023-10-08 PROCEDURE — 99222 1ST HOSP IP/OBS MODERATE 55: CPT | Performed by: INTERNAL MEDICINE

## 2023-10-08 PROCEDURE — 99207 PR NO CHARGE LOS: CPT | Performed by: HOSPITALIST

## 2023-10-08 PROCEDURE — 84484 ASSAY OF TROPONIN QUANT: CPT | Performed by: HOSPITALIST

## 2023-10-08 PROCEDURE — 250N000011 HC RX IP 250 OP 636: Performed by: HOSPITALIST

## 2023-10-08 PROCEDURE — 96365 THER/PROPH/DIAG IV INF INIT: CPT

## 2023-10-08 PROCEDURE — 96375 TX/PRO/DX INJ NEW DRUG ADDON: CPT | Mod: 59

## 2023-10-08 PROCEDURE — 36415 COLL VENOUS BLD VENIPUNCTURE: CPT | Performed by: HOSPITALIST

## 2023-10-08 PROCEDURE — 250N000009 HC RX 250: Performed by: HOSPITALIST

## 2023-10-08 PROCEDURE — 81001 URINALYSIS AUTO W/SCOPE: CPT | Performed by: EMERGENCY MEDICINE

## 2023-10-08 PROCEDURE — 96372 THER/PROPH/DIAG INJ SC/IM: CPT | Performed by: HOSPITALIST

## 2023-10-08 PROCEDURE — 74177 CT ABD & PELVIS W/CONTRAST: CPT

## 2023-10-08 PROCEDURE — G0378 HOSPITAL OBSERVATION PER HR: HCPCS

## 2023-10-08 PROCEDURE — 250N000011 HC RX IP 250 OP 636: Mod: JZ | Performed by: EMERGENCY MEDICINE

## 2023-10-08 PROCEDURE — 99236 HOSP IP/OBS SAME DATE HI 85: CPT | Performed by: HOSPITALIST

## 2023-10-08 PROCEDURE — 258N000003 HC RX IP 258 OP 636: Performed by: HOSPITALIST

## 2023-10-08 RX ORDER — FAMOTIDINE 20 MG/1
20 TABLET, FILM COATED ORAL 2 TIMES DAILY
Status: DISCONTINUED | OUTPATIENT
Start: 2023-10-08 | End: 2023-10-08 | Stop reason: HOSPADM

## 2023-10-08 RX ORDER — PROCHLORPERAZINE 25 MG
12.5 SUPPOSITORY, RECTAL RECTAL EVERY 12 HOURS PRN
Status: DISCONTINUED | OUTPATIENT
Start: 2023-10-08 | End: 2023-10-08 | Stop reason: HOSPADM

## 2023-10-08 RX ORDER — LISINOPRIL 5 MG/1
10 TABLET ORAL DAILY
Status: DISCONTINUED | OUTPATIENT
Start: 2023-10-08 | End: 2023-10-08 | Stop reason: HOSPADM

## 2023-10-08 RX ORDER — ACETAMINOPHEN 650 MG/1
650 SUPPOSITORY RECTAL EVERY 6 HOURS PRN
Status: DISCONTINUED | OUTPATIENT
Start: 2023-10-08 | End: 2023-10-08 | Stop reason: HOSPADM

## 2023-10-08 RX ORDER — BUPRENORPHINE 2 MG/1
2 TABLET SUBLINGUAL 3 TIMES DAILY
Status: DISCONTINUED | OUTPATIENT
Start: 2023-10-08 | End: 2023-10-08 | Stop reason: HOSPADM

## 2023-10-08 RX ORDER — AMOXICILLIN 250 MG
2 CAPSULE ORAL 2 TIMES DAILY PRN
Status: DISCONTINUED | OUTPATIENT
Start: 2023-10-08 | End: 2023-10-08 | Stop reason: HOSPADM

## 2023-10-08 RX ORDER — IOPAMIDOL 755 MG/ML
100 INJECTION, SOLUTION INTRAVASCULAR ONCE
Status: COMPLETED | OUTPATIENT
Start: 2023-10-08 | End: 2023-10-08

## 2023-10-08 RX ORDER — GUAIFENESIN 200 MG/10ML
200 LIQUID ORAL EVERY 4 HOURS PRN
Status: DISCONTINUED | OUTPATIENT
Start: 2023-10-08 | End: 2023-10-08 | Stop reason: HOSPADM

## 2023-10-08 RX ORDER — ENOXAPARIN SODIUM 100 MG/ML
40 INJECTION SUBCUTANEOUS EVERY 24 HOURS
Status: DISCONTINUED | OUTPATIENT
Start: 2023-10-08 | End: 2023-10-08 | Stop reason: HOSPADM

## 2023-10-08 RX ORDER — NALOXONE HYDROCHLORIDE 0.4 MG/ML
0.2 INJECTION, SOLUTION INTRAMUSCULAR; INTRAVENOUS; SUBCUTANEOUS
Status: DISCONTINUED | OUTPATIENT
Start: 2023-10-08 | End: 2023-10-08 | Stop reason: HOSPADM

## 2023-10-08 RX ORDER — LIDOCAINE 40 MG/G
CREAM TOPICAL
Status: DISCONTINUED | OUTPATIENT
Start: 2023-10-08 | End: 2023-10-08 | Stop reason: HOSPADM

## 2023-10-08 RX ORDER — BUPRENORPHINE 2 MG/1
2 TABLET SUBLINGUAL 3 TIMES DAILY
COMMUNITY
Start: 2023-09-25

## 2023-10-08 RX ORDER — ATORVASTATIN CALCIUM 40 MG/1
80 TABLET, FILM COATED ORAL DAILY
Status: DISCONTINUED | OUTPATIENT
Start: 2023-10-08 | End: 2023-10-08 | Stop reason: HOSPADM

## 2023-10-08 RX ORDER — LABETALOL HYDROCHLORIDE 5 MG/ML
20 INJECTION, SOLUTION INTRAVENOUS
Status: DISCONTINUED | OUTPATIENT
Start: 2023-10-08 | End: 2023-10-08 | Stop reason: HOSPADM

## 2023-10-08 RX ORDER — NALOXONE HYDROCHLORIDE 0.4 MG/ML
0.4 INJECTION, SOLUTION INTRAMUSCULAR; INTRAVENOUS; SUBCUTANEOUS
Status: DISCONTINUED | OUTPATIENT
Start: 2023-10-08 | End: 2023-10-08 | Stop reason: HOSPADM

## 2023-10-08 RX ORDER — ACETAMINOPHEN 325 MG/1
650 TABLET ORAL EVERY 6 HOURS PRN
Status: DISCONTINUED | OUTPATIENT
Start: 2023-10-08 | End: 2023-10-08 | Stop reason: HOSPADM

## 2023-10-08 RX ORDER — ALBUTEROL SULFATE 90 UG/1
2 AEROSOL, METERED RESPIRATORY (INHALATION) EVERY 6 HOURS PRN
Status: DISCONTINUED | OUTPATIENT
Start: 2023-10-08 | End: 2023-10-08 | Stop reason: HOSPADM

## 2023-10-08 RX ORDER — PROCHLORPERAZINE MALEATE 5 MG
5 TABLET ORAL EVERY 6 HOURS PRN
Status: DISCONTINUED | OUTPATIENT
Start: 2023-10-08 | End: 2023-10-08 | Stop reason: HOSPADM

## 2023-10-08 RX ORDER — AMOXICILLIN 250 MG
1 CAPSULE ORAL 2 TIMES DAILY PRN
Status: DISCONTINUED | OUTPATIENT
Start: 2023-10-08 | End: 2023-10-08 | Stop reason: HOSPADM

## 2023-10-08 RX ADMIN — SERTRALINE HYDROCHLORIDE 150 MG: 100 TABLET ORAL at 12:58

## 2023-10-08 RX ADMIN — LABETALOL HYDROCHLORIDE 20 MG: 5 INJECTION, SOLUTION INTRAVENOUS at 02:21

## 2023-10-08 RX ADMIN — SODIUM PHOSPHATE, MONOBASIC, MONOHYDRATE AND SODIUM PHOSPHATE, DIBASIC, ANHYDROUS 9 MMOL: 142; 276 INJECTION, SOLUTION INTRAVENOUS at 03:49

## 2023-10-08 RX ADMIN — IOPAMIDOL 90 ML: 755 INJECTION, SOLUTION INTRAVENOUS at 00:27

## 2023-10-08 RX ADMIN — ENOXAPARIN SODIUM 40 MG: 40 INJECTION SUBCUTANEOUS at 08:16

## 2023-10-08 RX ADMIN — REMDESIVIR 200 MG: 100 INJECTION, POWDER, LYOPHILIZED, FOR SOLUTION INTRAVENOUS at 03:17

## 2023-10-08 RX ADMIN — BUPRENORPHINE 2 MG: 2 TABLET SUBLINGUAL at 14:21

## 2023-10-08 RX ADMIN — SODIUM CHLORIDE 50 ML: 900 INJECTION INTRAVENOUS at 03:49

## 2023-10-08 RX ADMIN — IPRATROPIUM BROMIDE AND ALBUTEROL 2 PUFF: 20; 100 SPRAY, METERED RESPIRATORY (INHALATION) at 12:59

## 2023-10-08 RX ADMIN — PROCHLORPERAZINE EDISYLATE 5 MG: 5 INJECTION INTRAMUSCULAR; INTRAVENOUS at 11:27

## 2023-10-08 RX ADMIN — LISINOPRIL 10 MG: 5 TABLET ORAL at 12:56

## 2023-10-08 RX ADMIN — FAMOTIDINE 20 MG: 20 TABLET ORAL at 08:13

## 2023-10-08 RX ADMIN — ATORVASTATIN CALCIUM 80 MG: 40 TABLET, FILM COATED ORAL at 12:55

## 2023-10-08 RX ADMIN — IPRATROPIUM BROMIDE AND ALBUTEROL 2 PUFF: 20; 100 SPRAY, METERED RESPIRATORY (INHALATION) at 08:14

## 2023-10-08 ASSESSMENT — ACTIVITIES OF DAILY LIVING (ADL)
ADLS_ACUITY_SCORE: 35

## 2023-10-08 NOTE — ED NOTES
Pt noted to have a run of V-Tach vs. Torsades, MD paged to room overhead. MD Alejandra updated at bedside. STAT EKG obtained after order placed, pt placed on oxygen, spouse at bedside. Pt having episodes where she appears to be drowsing off, noted bradycardia and PVCs with cardiac rhythm changes. Multiple strips noted and sent to oncoming nurse assuming care. Moving patient to new room, oxygen applied, awaiting further orders and disposition.

## 2023-10-08 NOTE — H&P
[facility]    History and Physical - Hospitalist Service       Date of Admission:  10/7/2023    Assessment & Plan      Patricia Mcnamara is a 73 year old female admitted on 10/7/2023. She was sent to the emergency department from the urgency room for evaluation of COVID infection and shortness of breath    #COVID-19 infection  -Symptoms of headache, jaw pain, nausea and vomiting onset 10/7/2023  -Vaccinated and boosted  -CTA chest negative for PE but bilateral lower lobe atelectasis  -CRP inflammation 10.7, D-dimer 1.49  -Check influenza/RSV  -Special precautions  -At risk for decompensation secondary to comorbidities, will start remdesivir x3 days    #Nonsustained ventricular tachycardia  #Prolonged QTc 502  -24 beats of V. tach, question torsade pattern  -Correct potassium, magnesium, phosphorus  -CT chest showed cardiomegaly with moderate coronary atherosclerotic disease  -High-sensitivity troponin T not elevated  -Echocardiogram to evaluate structure and function  -Cardiology consult    #Hypertensive urgency  #Essential hypertension  -Reconcile PTA medications  -IV labetalol as needed    #COPD  -No bronchospasms or signs of acute exacerbation  -At risk for decompensation secondary to COVID-19 infection  -Scheduled Combivent inhalers  -Albuterol HFA as needed    #Hypophosphatemia, mild  -Replace    #Low TSH  -Free T4 within normal limits, subclinical  -Outpatient follow-up    #Tobacco abuse  -Smokes 1 pack/day  -Cessation education    #Hyperlipidemia  -Hold PTA statins    #Anxiety  -Supportive management  -Reconcile PTA medications  -Hold sertraline secondary to long QTc and NSVT/torsades    #Chronic pain  #Opioid dependence  -Follows at the pain clinic  -Reconcile PTA medications    #Radiographic findings  -Mild intrahepatic and extrahepatic biliary duct dilatation, CBD up to 11 mm, pancreatic duct up to 5 mm  -1.4 cm cystic lesion in the tail of the pancreas, unchanged from  5/13/2023  -Nonobstructive left lower pole collecting system calculi, the largest of which measures 4.5 mm  -Elevated alk phos but normal bilirubin and transaminases  -Outpatient follow-up (MRCP ordered through primary provider but not done yet)     Diet: Combination Diet Low Saturated Fat Na <2400mg Diet    DVT Prophylaxis: Enoxaparin (Lovenox) SQ  Gibbs Catheter: Not present  Lines: None     Cardiac Monitoring: ACTIVE order. Indication: Tachyarrhythmias, acute (48 hours)  Code Status: Full Code          Disposition Plan      Expected Discharge Date: 10/09/2023                  Marito Camilo MD  Hospitalist Service  [facility]  Securely message with [employer] (more info)  Text page via [facility] Paging/Directory     ______________________________________________________________________    Chief Complaint   Headache, jaw pain, epigastric discomfort, nausea, vomiting, shortness of breath    History is obtained from the patient, electronic health record, and emergency department physician    History of Present Illness   Patricia Mcnamara is a 73 year old female who was evaluated at the urgency room for positive home COVID test and increased shortness of breath.  Past medical history of COPD, hypertension, anxiety, hyperlipidemia, tobacco abuse, chronic pain.  Patient returned from a weeklong trip to [place] and [place] on 10/4/2023.  They traveled by airplane to visit friends.  She was doing well up until she woke up on 10/7/2023 with headache and jaw pain.  Subsequently, she had epigastric discomfort with associated nausea and vomiting.  Patient was noted to have increased shortness of breath from her baseline.  She smokes 1 pack of cigarette daily but no alcohol or drugs.  Given her symptoms and comorbidity she was sent to the ED for further evaluation.  Vital signs remarkable for hypertension 200s over 100s.  While on the monitor, patient was noted to have PVCs and 24 beats of ventricular  tachycardia.  She denied chest pain or palpitations.      Past Medical History    No past medical history on file.    Past Surgical History   No past surgical history on file.    Prior to Admission Medications   Prior to Admission Medications   Prescriptions Last Dose Informant Patient Reported? Taking?   Buprenorphine HCl (BELBUCA) 600 MCG FILM buccal film   Yes No   Sig: Place 600 mcg inside cheek every 12 hours   LORazepam (ATIVAN) 0.5 MG tablet   Yes No   Sig: Take 0.25 mg by mouth every 8 hours as needed   albuterol (PROAIR HFA/PROVENTIL HFA/VENTOLIN HFA) 108 (90 Base) MCG/ACT inhaler   No No   Sig: Inhale 2 puffs into the lungs every 4 hours as needed for shortness of breath, wheezing or cough   atorvastatin (LIPITOR) 80 MG tablet   Yes No   Sig: Take 80 mg by mouth daily   folic acid (FOLVITE) 1 MG tablet   Yes No   Sig: Take 1 mg by mouth daily   guaiFENesin (MUCINEX) 600 MG 12 hr tablet   No No   Sig: Take 2 tablets (1,200 mg) by mouth 2 times daily   levofloxacin (LEVAQUIN) 750 MG tablet   No No   Sig: Take 1 tablet (750 mg) by mouth daily   lisinopril (ZESTRIL) 10 MG tablet   Yes No   Sig: Take 10 mg by mouth daily   naproxen (NAPROSYN) 500 MG tablet   Yes No   Sig: Take 500 mg by mouth 2 times daily as needed for moderate pain   nystatin (MYCOSTATIN) 457590 UNIT/GM external cream   Yes No   Sig: daily as needed   oxyCODONE IR (ROXICODONE) 10 MG tablet   Yes No   Sig: Take 5 mg by mouth every 6 hours as needed   predniSONE (DELTASONE) 50 MG tablet   No No   Sig: Take 1 tablet (50 mg) by mouth daily   sertraline (ZOLOFT) 100 MG tablet   Yes No   Sig: Take 150 mg by mouth daily      Facility-Administered Medications: None           Physical Exam   Vital Signs: Temp: 98.4  F (36.9  C) Temp src: Oral BP: (!) 185/95 Pulse: 101   Resp: 23 SpO2: 95 % O2 Device: Nasal cannula Oxygen Delivery: 2 LPM  Weight: 171 lbs 0 oz    Constitutional: fatigued and somnolent  Respiratory: tachypneic, good air exchange, and  clear to auscultation  Cardiovascular: regular rate and rhythm, normal S1 and S2, and no murmur noted  GI: normal bowel sounds, soft, and tenderness noted in the epigastric region  Skin: no bruising or bleeding  Musculoskeletal: no lower extremity pitting edema present  Neurologic: Mental Status Exam:  Level of Alertness:   somnolent  Motor Exam:  moves all extremities well and symmetrically    Medical Decision Making       55 MINUTES SPENT BY ME on the date of service doing chart review, history, exam, documentation & further activities per the note.  MANAGEMENT DISCUSSED with the following over the past 24 hours: Patient and        Data     I have personally reviewed the following data over the past 24 hrs:    6.8  \   14.0   / 226     138 103 7.8 (L) /  156 (H)   3.9 24 0.44 (L) \     ALT: 37 AST: 40 AP: 137 (H) TBILI: 0.3   ALB: 4.0 TOT PROTEIN: 7.3 LIPASE: 17     Trop: 10 BNP: N/A     TSH: 0.25 (L) T4: 1.05 A1C: N/A     Procal: N/A CRP: 10.70 (H) Lactic Acid: 1.1       INR:  1.06 PTT:  N/A   D-dimer:  1.49 (H) Fibrinogen:  N/A       Imaging results reviewed over the past 24 hrs:   Recent Results (from the past 24 hour(s))   CT Chest Pulmonary Embolism w Contrast    Narrative    EXAM: CT CHEST PULMONARY EMBOLISM W CONTRAST  LOCATION: [facility]  DATE: 10/8/2023    INDICATION: covid dyspnea  COMPARISON: None.  TECHNIQUE: CT chest pulmonary angiogram during arterial phase injection of IV contrast. Multiplanar reformats and MIP reconstructions were performed. Dose reduction techniques were used.   CONTRAST: 90 ml iso 370    FINDINGS:  ANGIOGRAM CHEST: Pulmonary arteries are normal caliber and negative for pulmonary emboli. Thoracic aorta is negative for dissection. No CT evidence of right heart strain.    LUNGS AND PLEURA: Mild emphysematous changes of lungs are seen bilaterally. Discoid atelectasis is seen in the lower lobes bilaterally (image 62, series 9; image 78, series 9). Mild  dependent atelectatic changes are also present in the lower lobes   bilaterally. No pleural effusion or pneumothorax is identified.     MEDIASTINUM/AXILLAE: The heart is mildly enlarged. The aorta is normal in size and caliber. No worrisome mediastinal lymphadenopathy is identified.    CORONARY ARTERY CALCIFICATION: Moderate.    UPPER ABDOMEN: Normal.    MUSCULOSKELETAL: Normal.      Impression    IMPRESSION:  1.  No evidence of pulmonary embolus to the subsegmental level.  2.  Mild emphysematous changes of the lungs bilaterally with Discoid bilateral lower lobe atelectasis  3.  cardiomegaly with moderate coronary atherosclerotic disease, correlate with cardiac function   CT Abdomen Pelvis w Contrast    Narrative    EXAM: CT ABDOMEN PELVIS W CONTRAST  LOCATION: [facility]  DATE: 10/8/2023    INDICATION: abdominal pain  COMPARISON: CT dated 05/13/2023  TECHNIQUE: CT scan of the abdomen and pelvis was performed following injection of IV contrast. Multiplanar reformats were obtained. Dose reduction techniques were used.  CONTRAST: 90 ml iso 370    FINDINGS:   LOWER CHEST: Please refer to CT chest report obtained at the same time.    HEPATOBILIARY: Mild intra and extrahepatic biliary duct dilatation. The common bile duct measures 11 mm in diameter. No calcified ductal stones are identified. The gallbladder is unremarkable.    PANCREAS: The previously seen 1.4 cm cystic lesion in the tail of the pancreas is again identified, currently measuring by 14 mm, unchanged from prior exam a lesser. The remainder the pancreas is noted. Pancreatic duct is mildly dilated measuring 5 mm in   the pancreatic head (image 66, series 3).    SPLEEN: Normal size.    ADRENAL GLANDS: No significant nodules.    KIDNEYS/BLADDER: No significant mass or hydronephrosis. Nonobstructive left lower pole collecting system calculi are again identified, the largest of which measures 4.5 mm in diameter, unchanged from prior exam.  There is no evidence of hydronephrosis or   hydroureter. The bladder is unremarkable. There are simple or benign cysts. No follow up is needed.    BOWEL: No obstruction or inflammatory change. The appendix is normal.    LYMPH NODES: No lymphadenopathy.    VASCULATURE: No abdominal aortic aneurysm.    PELVIC ORGANS: No pelvic masses.    MUSCULOSKELETAL: Multilevel discogenic and posterior facet degenerative changes are seen throughout the lumbar spine, similar to prior exam.      Impression    IMPRESSION:   1.  Mild intrahepatic and extra hepatic biliary duct dilatation with dilatation of the common bile duct up to 11 mm diameter, as well as mild dilatation of the pancreatic duct up to 5 mm in diameter. No calcified ductal stones or pancreatic head masses   are identified, correlation with patient's legal symptomatology and liver enzymes is recommended, a noncalcified ductal stone or stricture cannot be excluded, if further evaluation is warranted MRCP or ERCP can be obtained.  2.  Redemonstration of the 1.4 cm cystic lesion in the tail the pancreas, unchanged when compared to 05/13/2023, this could reflect a small IPM recommend further evaluation with MRI MRCP for more complete evaluation.  3.  Redemonstration of nonobstructive left lower pole collecting system calculi, the largest of which measures 4.5 mm

## 2023-10-08 NOTE — PROGRESS NOTES
Patient is alert,oriented and independent. She expects to discharge to home with , Wilver, [facility] will transport. [place] notice given. No needs identified.

## 2023-10-08 NOTE — ED PROVIDER NOTES
EMERGENCY DEPARTMENT NOTE     Name: Patricia Mcnamara    Age/Sex: 73 year old female   MRN: [id number]   Evaluation Date & Time:  10/7/2023 10:07 PM    PCP:    No Ref-Primary, Physician   ED Provider: Alexis Alejandra D.O.       CHIEF COMPLAINT    Covid Concern and Shortness of Breath       DIAGNOSIS & DISPOSITION/MEDICAL DECISION MAKING     1. COVID-19 virus infection    2. Torsades de pointes (H)    3. Abdominal pain, epigastric        Patricia Mcnamara is a 73 year old female with relevant past history of anxiety, HLD, HTN, COPD, who presents to the emergency department for evaluation of shortness of breath.    Differential  diagnosis considered included but not limited to CS, pneumonia, CHF, pericarditis or pericardial effusion, reviewed exacerbation of respiratory failure intra-abdominal process including bowel obstruction or perforation, cholecystitis, choledocholithiasis, pancreatitis    Medical Decision Making  Patient was initially triaged IV established labs drawn patient was placed on the monitor.  Initial EKG showed sinus rhythm with normal intervals including normal QTc.  On exam had normal cardiac and pulmonary exam, midepigastric tenderness on abdominal exam without peritoneal signs.    Patient noted on the monitor then to have approximately 5-second run of wide-complex tachycardia polymorphic consistent with torsades.  She was given 2 g of magnesium.  Repeat EKG showed QTc of 507 but otherwise unchanged.  Received IV fentanyl for abdominal pain which resolved as did  shortness of breath.  Laboratory evaluation showed evaded D-dimer 1.5, elevated troponin, magnesium 1.8, basic metabolic profile with greater than 0.4 otherwise normal electrolytes including potassium.  Hepatic profile and lipase within normal limits, urinalysis with few bacteria.  TSH was low at 0.2.5 but had normal free T4.  WBC 6.8, hemoglobin 14, platelet count 226.  VBG with pH 7.38 and PCO2 to 48 interpreted as mild chronic CO2 retention,  urinalysis without pyuria with few bacteria  CTA of the chest without evidence of pulmonary embolism, pericardial effusion or discrete infiltrate.  CT of the abdomen and pelvis had dilation in the common and intrahepatic duct to 11 mm but no obstructing mass and   Choledocholithiasis.  Pancreas appeared normal.  No obstructing urolithiasis, no other inflammatory process  Patient is remained hemodynamically stable recurrent arrhythmia.. Cause of the torsades not definitively identified she is on multiple T prolonging medications which may be contributing.  Will be admitted to cardiac telemetry observation with continued symptomatic care for COVID, monitor rhythm with holding of medications that could cause QTc prolongation.  Findings and plan for admission discussed with the patient and her  and they are in agreement.  Case was discussed with the hospitalist service Dr. Camilo      Interventions: IV magnesium, fentanyl  Discharge Vital Signs:BP (!) 155/88   Pulse 92   Temp 98.4  F (36.9  C) (Oral)   Resp 17   Wt 77.6 kg (171 lb)   SpO2 94%   BMI 27.60 kg/m       DISPOSITION: To cardiac telemetry observation/[facility]    Diagnostic studies:  Imaging:  CT Abdomen Pelvis w Contrast   Final Result   IMPRESSION:    1.  Mild intrahepatic and extra hepatic biliary duct dilatation with dilatation of the common bile duct up to 11 mm diameter, as well as mild dilatation of the pancreatic duct up to 5 mm in diameter. No calcified ductal stones or pancreatic head masses    are identified, correlation with patient's legal symptomatology and liver enzymes is recommended, a noncalcified ductal stone or stricture cannot be excluded, if further evaluation is warranted MRCP or ERCP can be obtained.   2.  Redemonstration of the 1.4 cm cystic lesion in the tail the pancreas, unchanged when compared to 05/13/2023, this could reflect a small IPM recommend further evaluation with MRI MRCP for more complete evaluation.   3.   Redemonstration of nonobstructive left lower pole collecting system calculi, the largest of which measures 4.5 mm      CT Chest Pulmonary Embolism w Contrast   Final Result   IMPRESSION:   1.  No evidence of pulmonary embolus to the subsegmental level.   2.  Mild emphysematous changes of the lungs bilaterally with Discoid bilateral lower lobe atelectasis   3.  cardiomegaly with moderate coronary atherosclerotic disease, correlate with cardiac function      Echocardiogram Complete    (Results Pending)      Lab:  Labs Ordered and Resulted from Time of ED Arrival to Time of ED Departure   ROUTINE UA WITH MICROSCOPIC REFLEX TO CULTURE - Abnormal       Result Value    Color Urine Light Yellow      Appearance Urine Turbid (*)     Glucose Urine Negative      Bilirubin Urine Negative      Ketones Urine 10 (*)     Specific Gravity Urine 1.025      Blood Urine Negative      pH Urine 8.0 (*)     Protein Albumin Urine Negative      Urobilinogen Urine <2.0      Nitrite Urine Negative      Leukocyte Esterase Urine Negative      Bacteria Urine Few (*)     Mucus Urine Present (*)     Amorphous Crystals Urine Many (*)     RBC Urine 0      WBC Urine 0     HEPATIC FUNCTION PANEL - Abnormal    Protein Total 7.3      Albumin 4.0      Bilirubin Total 0.3      Alkaline Phosphatase 137 (*)     AST 40      ALT 37      Bilirubin Direct <0.20     TSH WITH FREE T4 REFLEX - Abnormal    TSH 0.25 (*)    BASIC METABOLIC PANEL - Abnormal    Sodium 138      Potassium 3.9      Chloride 103      Carbon Dioxide (CO2) 24      Anion Gap 11      Urea Nitrogen 7.8 (*)     Creatinine 0.44 (*)     GFR Estimate >90      Calcium 10.1      Glucose 156 (*)    CBC WITH PLATELETS AND DIFFERENTIAL - Abnormal    WBC Count 6.8      RBC Count 4.96      Hemoglobin 14.0      Hematocrit 44.1      MCV 89      MCH 28.2      MCHC 31.7      RDW 15.1 (*)     Platelet Count 226      % Neutrophils 85      % Lymphocytes 4      % Monocytes 10      Mids % (Monos, Eos, Basos)         % Eosinophils 0      % Basophils 0      % Immature Granulocytes 1      NRBCs per 100 WBC 0      Absolute Neutrophils 5.8      Absolute Lymphocytes 0.3 (*)     Absolute Monocytes 0.7      Mids Abs (Monos, Eos, Basos)        Absolute Eosinophils 0.0      Absolute Basophils 0.0      Absolute Immature Granulocytes 0.0      Absolute NRBCs 0.0     BLOOD GAS VENOUS - Abnormal    pH Venous 7.38      pCO2 Venous 48      pO2 Venous 49 (*)     Bicarbonate Venous 29      Base Excess/Deficit 3.5      Oxyhemoglobin Venous 82.5 (*)     O2 Sat, Venous 85.2 (*)    D DIMER QUANTITATIVE - Abnormal    D-Dimer Quantitative 1.49 (*)    CRP INFLAMMATION - Abnormal    CRP Inflammation 10.70 (*)    PHOSPHORUS - Abnormal    Phosphorus 2.4 (*)    INFLUENZA A/B, RSV, & SARS-COV2 PCR - Abnormal    Influenza A PCR Negative      Influenza B PCR Negative      RSV PCR Negative      SARS CoV2 PCR Positive (*)    LACTIC ACID WHOLE BLOOD - Normal    Lactic Acid 1.1     TROPONIN T, HIGH SENSITIVITY - Normal    Troponin T, High Sensitivity 11     MAGNESIUM - Normal    Magnesium 1.8     LIPASE - Normal    Lipase 17     T4 FREE - Normal    Free T4 1.05     INR - Normal    INR 1.06     TROPONIN T, HIGH SENSITIVITY - Normal    Troponin T, High Sensitivity 10               Triage note reviewed:Pt presents to ED via EMS from urgent care c/o stomach pain, nausea and headache. She tested positive for Covid today. ABCs intact.      Triage Assessment       Row Name 10/07/23 6721       Triage Assessment (Adult)    Airway WDL WDL       Respiratory WDL    Respiratory WDL WDL       Cardiac WDL    Cardiac WDL WDL       Cognitive/Neuro/Behavioral WDL    Cognitive/Neuro/Behavioral WDL WDL                  Medical Decision Making    History:  Supplemental history from: Patient's   External Record(s) reviewed: Care note from today    Work Up:  Chart documentation includes differential considered and any EKGs or imaging independently interpreted by provider,  where specified.  In additional to work up documented, I considered the following work up: Side of the abdomen but low suspicion for choledocholithiasis or biliary obstruction    External consultation:  Discussion of management with another provider: Hospitalist    Complicating factors:  Care impacted by chronic illness: Chronic Lung Disease, Hyperlipidemia, and Hypertension  Care affected by social determinants of health: N/A    Disposition considerations: Admit.      At the conclusion of the encounter I discussed the results of all of the tests and the disposition. The questions were answered. The patient or family acknowledged understanding and was agreeable with the care plan.    TOTAL CRITICAL CARE TIME (EXCLUDING PROCEDURES): 30 minutes for management of cardiac arrhythmia torsades de point with IV magnesium    PROCEDURES:   None    EMERGENCY DEPARTMENT COURSE   10:45 PM I met with the patient to gather history and to perform my initial exam.  We discussed treatment options and the plan for care while in the Emergency Department.    ED INTERVENTIONS     Medications   labetalol (NORMODYNE/TRANDATE) injection 20 mg (20 mg Intravenous $Given 10/8/23 0221)   lidocaine 1 % 0.1-1 mL (has no administration in time range)   lidocaine (LMX4) cream (has no administration in time range)   sodium chloride (PF) 0.9% PF flush 3 mL (3 mLs Intracatheter $Given 10/8/23 0222)   sodium chloride (PF) 0.9% PF flush 3 mL (has no administration in time range)   Medication instructions: Do NOT use nebulized medications (has no administration in time range)   remdesivir 100 mg in sodium chloride 0.9 % 250 mL intermittent infusion (has no administration in time range)     And   sodium chloride 0.9% BOLUS 50 mL (has no administration in time range)   ipratropium-albuterol (COMBIVENT RESPIMAT) inhaler 2 puff (has no administration in time range)   acetaminophen (TYLENOL) tablet 650 mg (has no administration in time range)     Or    acetaminophen (TYLENOL) Suppository 650 mg (has no administration in time range)   senna-docusate (SENOKOT-S/PERICOLACE) 8.6-50 MG per tablet 1 tablet (has no administration in time range)     Or   senna-docusate (SENOKOT-S/PERICOLACE) 8.6-50 MG per tablet 2 tablet (has no administration in time range)   prochlorperazine (COMPAZINE) injection 5 mg (has no administration in time range)     Or   prochlorperazine (COMPAZINE) tablet 5 mg (has no administration in time range)     Or   prochlorperazine (COMPAZINE) suppository 12.5 mg (has no administration in time range)   famotidine (PEPCID) tablet 20 mg (has no administration in time range)   enoxaparin ANTICOAGULANT (LOVENOX) injection 40 mg (has no administration in time range)   albuterol (PROVENTIL HFA/VENTOLIN HFA) inhaler (has no administration in time range)   sodium phosphate 9 mmol in sodium chloride 0.9 % 250 mL intermittent infusion (9 mmol Intravenous $Given 10/8/23 0349)   guaiFENesin (ROBITUSSIN) 20 mg/mL solution 200 mg (has no administration in time range)   magnesium sulfate 2 g in 50 mL sterile water intermittent infusion (0 g Intravenous Stopped 10/7/23 2322)   fentaNYL (PF) (SUBLIMAZE) injection 50 mcg (50 mcg Intravenous $Given 10/7/23 2341)   iopamidol (ISOVUE-370) solution 100 mL (90 mLs Intravenous $Given 10/8/23 0027)   remdesivir 200 mg in sodium chloride 0.9 % 250 mL intermittent infusion (0 mg Intravenous Stopped 10/8/23 0348)     Followed by   sodium chloride 0.9% BOLUS 50 mL (0 mLs Intravenous Stopped 10/8/23 0352)       DISCHARGE MEDICATIONS        Review of your medicines        UNREVIEWED medicines. Ask your doctor about these medicines        Dose / Directions   albuterol 108 (90 Base) MCG/ACT inhaler  Commonly known as: PROAIR HFA/PROVENTIL HFA/VENTOLIN HFA  Used for: COPD exacerbation (H)      Dose: 2 puff  Inhale 2 puffs into the lungs every 4 hours as needed for shortness of breath, wheezing or cough  Quantity: 18 g  Refills: 11      atorvastatin 80 MG tablet  Commonly known as: LIPITOR      Dose: 80 mg  Take 80 mg by mouth daily  Refills: 0     Buprenorphine HCl 600 MCG Film buccal film  Commonly known as: BELBUCA      Dose: 600 mcg  Place 600 mcg inside cheek every 12 hours  Refills: 0     folic acid 1 MG tablet  Commonly known as: FOLVITE      Dose: 1 mg  Take 1 mg by mouth daily  Refills: 0     guaiFENesin 600 MG 12 hr tablet  Commonly known as: MUCINEX  Used for: Pneumonia due to infectious organism, unspecified laterality, unspecified part of lung      Dose: 1,200 mg  Take 2 tablets (1,200 mg) by mouth 2 times daily  Quantity: 20 tablet  Refills: 1     levofloxacin 750 MG tablet  Commonly known as: LEVAQUIN  Used for: Pneumonia due to infectious organism, unspecified laterality, unspecified part of lung      Dose: 750 mg  Take 1 tablet (750 mg) by mouth daily  Quantity: 5 tablet  Refills: 0     lisinopril 10 MG tablet  Commonly known as: ZESTRIL      Dose: 10 mg  Take 10 mg by mouth daily  Refills: 0     LORazepam 0.5 MG tablet  Commonly known as: ATIVAN      Dose: 0.25 mg  Take 0.25 mg by mouth every 8 hours as needed  Refills: 0     naproxen 500 MG tablet  Commonly known as: NAPROSYN      Dose: 500 mg  Take 500 mg by mouth 2 times daily as needed for moderate pain  Refills: 0     nystatin [id number] UNIT/GM external cream  Commonly known as: MYCOSTATIN      daily as needed  Refills: 0     oxyCODONE IR 10 MG tablet  Commonly known as: ROXICODONE      Dose: 5 mg  Take 5 mg by mouth every 6 hours as needed  Refills: 0     predniSONE 50 MG tablet  Commonly known as: DELTASONE  Used for: COPD exacerbation (H)      Dose: 50 mg  Take 1 tablet (50 mg) by mouth daily  Quantity: 4 tablet  Refills: 0     sertraline 100 MG tablet  Commonly known as: ZOLOFT      Dose: 150 mg  Take 150 mg by mouth daily  Refills: 0              INFORMATION SOURCE AND LIMITATIONS    History/Exam limitations: None  Patient information was obtained from: patient  Use of  : N/A    HISTORY OF PRESENT ILLNESS   Patricia Mcnamara is a 73 year old year old female with a relevant past history of anxiety, HLD, HTN, COPD, who presents to this ED by EMS for evaluation of shortness of breath. Patient arrives by EMS transfer from Urgent Care. [facility] with complaints of difficulty breathing as well as a headache, some abdominal pain, nausea and vomiting. She tested positive for COVID-19 earlier today. She is vaccinated against Covid x5. Transferred here for further evaluation.    Per chart review, patient was seen at [facility] [place] earlier today (10/7/23) with complaints of shortness of breath, headache, abdominal pain, nausea and vomiting. Tested positive for Covid earlier today. Vaccinated against Covid x5. She is not hypoxic. Not tachypneic. Given her subjective symptoms and positive COVID-19 test, she was transferred to [facility] ED via EMS for further evaluation.    REVIEW OF SYSTEMS:   All other systems reviewed and are negative except as noted above in HPI.    PATIENT HISTORY   No past medical history on file.  Patient Active Problem List   Diagnosis    Left foot pain    Aftercare following surgery of the musculoskeletal system    Acute respiratory failure with hypoxia (H)    Pneumonia due to infectious organism, unspecified laterality, unspecified part of lung    Anxiety state    Chronic obstructive pulmonary disease (H)    Hyperlipidemia    Hypertension    Abdominal pain, epigastric    Torsades de pointes (H)    COVID-19 virus infection     No past surgical history on file.    Allergies   Allergen Reactions    Diclofenac Anaphylaxis    Ketorolac Anaphylaxis    Ketorolac Tromethamine Anaphylaxis    Sulfa Antibiotics Anaphylaxis    Cephradine      Other reaction(s): *Unknown    Morphine Nausea       OUTPATIENT MEDICATIONS     New Prescriptions    No medications on file      Vitals:    10/08/23 0230 10/08/23 0300  10/08/23 0315 10/08/23 0330   BP: (!) 151/81 (!) 151/89 (!) 154/92 (!) 155/88   Pulse: 95 96 92 92   Resp: 18 17 19 17   Temp:       TempSrc:       SpO2: 94% 96% 96% 94%   Weight:           Physical Exam   Constitutional: Oriented to person, place, and time. Appears well-developed and well-nourished.   HEENT:    Head: Atraumatic.   Neck: Normal range of motion. Neck supple.   Cardiovascular: Normal rate, regular rhythm and normal heart sounds.    Pulmonary/Chest: Normal effort  and breath sounds normal.   Abdominal: Soft. Bowel sounds are normal.  Gastric tenderness without guarding or peritoneal signs  Musculoskeletal: Normal range of motion.   Neurological: Alert and oriented to person, place, and time. Normal strength. No sensory deficit. No cranial nerve deficit.  Skin: Skin is warm and dry.   Psychiatric: Normal mood and affect. Behavior is normal. Thought content normal.     DIAGNOSTICS    LABORATORY FINDINGS (REVIEWED AND INTERPRETED):  Labs Ordered and Resulted from Time of ED Arrival to Time of ED Departure   ROUTINE UA WITH MICROSCOPIC REFLEX TO CULTURE - Abnormal       Result Value    Color Urine Light Yellow      Appearance Urine Turbid (*)     Glucose Urine Negative      Bilirubin Urine Negative      Ketones Urine 10 (*)     Specific Gravity Urine 1.025      Blood Urine Negative      pH Urine 8.0 (*)     Protein Albumin Urine Negative      Urobilinogen Urine <2.0      Nitrite Urine Negative      Leukocyte Esterase Urine Negative      Bacteria Urine Few (*)     Mucus Urine Present (*)     Amorphous Crystals Urine Many (*)     RBC Urine 0      WBC Urine 0     HEPATIC FUNCTION PANEL - Abnormal    Protein Total 7.3      Albumin 4.0      Bilirubin Total 0.3      Alkaline Phosphatase 137 (*)     AST 40      ALT 37      Bilirubin Direct <0.20     TSH WITH FREE T4 REFLEX - Abnormal    TSH 0.25 (*)    BASIC METABOLIC PANEL - Abnormal    Sodium 138      Potassium 3.9      Chloride 103      Carbon Dioxide (CO2) 24       Anion Gap 11      Urea Nitrogen 7.8 (*)     Creatinine 0.44 (*)     GFR Estimate >90      Calcium 10.1      Glucose 156 (*)    CBC WITH PLATELETS AND DIFFERENTIAL - Abnormal    WBC Count 6.8      RBC Count 4.96      Hemoglobin 14.0      Hematocrit 44.1      MCV 89      MCH 28.2      MCHC 31.7      RDW 15.1 (*)     Platelet Count 226      % Neutrophils 85      % Lymphocytes 4      % Monocytes 10      Mids % (Monos, Eos, Basos)        % Eosinophils 0      % Basophils 0      % Immature Granulocytes 1      NRBCs per 100 WBC 0      Absolute Neutrophils 5.8      Absolute Lymphocytes 0.3 (*)     Absolute Monocytes 0.7      Mids Abs (Monos, Eos, Basos)        Absolute Eosinophils 0.0      Absolute Basophils 0.0      Absolute Immature Granulocytes 0.0      Absolute NRBCs 0.0     BLOOD GAS VENOUS - Abnormal    pH Venous 7.38      pCO2 Venous 48      pO2 Venous 49 (*)     Bicarbonate Venous 29      Base Excess/Deficit 3.5      Oxyhemoglobin Venous 82.5 (*)     O2 Sat, Venous 85.2 (*)    D DIMER QUANTITATIVE - Abnormal    D-Dimer Quantitative 1.49 (*)    CRP INFLAMMATION - Abnormal    CRP Inflammation 10.70 (*)    PHOSPHORUS - Abnormal    Phosphorus 2.4 (*)    INFLUENZA A/B, RSV, & SARS-COV2 PCR - Abnormal    Influenza A PCR Negative      Influenza B PCR Negative      RSV PCR Negative      SARS CoV2 PCR Positive (*)    LACTIC ACID WHOLE BLOOD - Normal    Lactic Acid 1.1     TROPONIN T, HIGH SENSITIVITY - Normal    Troponin T, High Sensitivity 11     MAGNESIUM - Normal    Magnesium 1.8     LIPASE - Normal    Lipase 17     T4 FREE - Normal    Free T4 1.05     INR - Normal    INR 1.06     TROPONIN T, HIGH SENSITIVITY - Normal    Troponin T, High Sensitivity 10         IMAGING (REVIEWED AND INTERPRETED):  CT Abdomen Pelvis w Contrast   Final Result   IMPRESSION:    1.  Mild intrahepatic and extra hepatic biliary duct dilatation with dilatation of the common bile duct up to 11 mm diameter, as well as mild dilatation of the  pancreatic duct up to 5 mm in diameter. No calcified ductal stones or pancreatic head masses    are identified, correlation with patient's legal symptomatology and liver enzymes is recommended, a noncalcified ductal stone or stricture cannot be excluded, if further evaluation is warranted MRCP or ERCP can be obtained.   2.  Redemonstration of the 1.4 cm cystic lesion in the tail the pancreas, unchanged when compared to 05/13/2023, this could reflect a small IPM recommend further evaluation with MRI MRCP for more complete evaluation.   3.  Redemonstration of nonobstructive left lower pole collecting system calculi, the largest of which measures 4.5 mm      CT Chest Pulmonary Embolism w Contrast   Final Result   IMPRESSION:   1.  No evidence of pulmonary embolus to the subsegmental level.   2.  Mild emphysematous changes of the lungs bilaterally with Discoid bilateral lower lobe atelectasis   3.  cardiomegaly with moderate coronary atherosclerotic disease, correlate with cardiac function      Echocardiogram Complete    (Results Pending)       ECG (REVIEWED AND INTERPRETED):   ECG:   Performed at: 22:51  HR:  92 bpm  Rhythm: sinus  Axis: 64, 47, 68  QRS duration: 124  QTC: 502  ST changes: No ST segment elevation or depression, no T wave inversion,No Q wave  Interpretation: Sinus rhythm.   Compared to most recent ECG from: No significant changes compared to previous EKG from 5/13/2023.    I have reviewed the patient's ECG, with comments made as listed above. Please see scanned image for full interpretation.       I, Mynor Celeste, am serving as a scribe to document services personally performed by Alexis Alejandra D.O., based on my observation and the provider s statements to me.    I, Alexis Alejandra D.O., attest that Mynor Celeste is acting in a scribe capacity, has observed my performance of the services and has documented them in accordance with my direction.    Alexis Alejandra D.O.  EMERGENCY MEDICINE   10/07/23  [facility]  [facility] EMERGENCY DEPARTMENT  [address] [place] [id number]  [phone]  Dept: [phone]     Alexis Alejandra DO  10/08/23 0424

## 2023-10-08 NOTE — DISCHARGE INSTRUCTIONS
Discharge Orders          Reason for your hospital stay     COVID-19 and it was thought you had an abnormal heart rhythm but upon the cardiologist reviewing it, it was felt it was just a false reading that was picked up, called artifact.          Follow-up and recommended labs and tests      Follow up with primary care provider, Enma Burrell, within 7 days for hospital follow- up.   Reach out to your PCP tomorrow on 10/9/23 to see if you qualify for Paxlovid.    Follow-up Appointments     Follow-up and recommended labs and tests       Follow up with primary care provider, Enma Burrell, within 7 days   for hospital follow- up.  No follow up labs or test are needed. Reach out   to your PCP tomorrow on 10/9/23 to see if you qualify for Paxlovid.        Plan for outpatient MRCP to further evaluate biliary system given ductal dilation     Recheck EKG as outpatient to reassess QTc, if still prolonged would assess Sertraline and other QT prolonging meds      Activity     Your activity upon discharge: activity as tolerated          Diet     Follow this diet upon discharge: Low Saturated Fat Na <2400mg Diet

## 2023-10-08 NOTE — CONSULTS
HEART CARE CONSULTATON NOTE        Assessment/Recommendations   Assessment/Plan:    Clinically Significant Risk Factors Present on Admission                  # Hypertension: Noted on problem list              Patricia Mcnamara is a 73 year old female with medical history of COPD, hypertension, anxiety, hyperlipidemia, tobacco abuse, chronic pain. Cardiology was consulted for concern of NSVT.     Concern for NSVT  - Telemetry strip reviewed, the suspected NSVT is an artifact and not a true NSVT  - Continue monitoring  - cardiology will sign off    2. HTN  - uncontrolled  - recommend restarting home BP meds    3. Hyperlipidemia  - restart atorvastatin         History of Present Illness/Subjective    HPI: Patricia Mcnamara is a 73 year old female with medical history of COPD, hypertension, anxiety, hyperlipidemia, tobacco abuse, chronic pain. Cardiology was consulted for NSVT.     Patient returned from a weeklong trip to [place] and [place] on 10/4/2023. They traveled by airplane to visit friends. She was doing well up until she woke up on 10/7/2023 with headache and jaw pain. Subsequently, she had epigastric discomfort with associated nausea and vomiting. Patient was noted to have increased shortness of breath from her baseline. She smokes 1 pack of cigarette daily but no alcohol or drugs. Given her symptoms and comorbidity she was sent to the ED for further evaluation. Vital signs remarkable for hypertension 200s over 100s. While on the monitor, patient was noted to have PVCs and 24 beats of ventricular tachycardia. She denied chest pain or palpitations.        Physical Examination  Review of Systems   VITALS: BP (!) 161/87   Pulse 104   Temp 98.4  F (36.9  C) (Oral)   Resp 21   Wt 77.6 kg (171 lb)   SpO2 95%   BMI 27.60 kg/m    BMI: Body mass index is 27.6 kg/m .  Wt Readings from Last 3 Encounters:   10/08/23 77.6 kg (171 lb)   05/14/23 77.8 kg (171 lb 9.6 oz)   07/26/22 74.4 kg (164 lb)       Intake/Output Summary  (Last 24 hours) at 10/8/2023 0823  Last data filed at 10/8/2023 0352  Gross per 24 hour   Intake 350 ml   Output 400 ml   Net -50 ml     General Appearance:   no distress, normal body habitus   ENT/Mouth: membranes moist, no oral lesions or bleeding gums.      EYES:  no scleral icterus, normal conjunctivae   Neck: no carotid bruits or thyromegaly   Chest/Lungs:   lungs are clear to auscultation, no rales or wheezing, no sternal scar, equal chest wall expansion    Cardiovascular:   Regular. Normal first and second heart sounds with no murmurs, rubs, or gallops; the carotid, radial and posterior tibial pulses are intact, no edema bilaterally    Abdomen:  no organomegaly, masses, bruits, or tenderness; bowel sounds are present   Extremities: no cyanosis or clubbing   Skin: no xanthelasma, warm.    Neurologic: normal  bilateral, no tremors     Psychiatric: alert and oriented x3, calm     Review Of Systems  Skin: negative  Eyes: negative  Ears/Nose/Throat: negative  Gastrointestinal: negative  Genitourinary: negative  Musculoskeletal: negative  Neurologic: negative  Psychiatric: negative  Hematologic/Lymphatic/Immunologic: negative  Endocrine: negative          Lab Results    Chemistry/lipid CBC Cardiac Enzymes/BNP/TSH/INR   No results for input(s): CHOL, HDL, LDL, TRIG, CHOLHDLRATIO in the last 84002 hours.  No results for input(s): LDL in the last 90678 hours.  Recent Labs   Lab Test 10/07/23  2240      POTASSIUM 3.9   CHLORIDE 103   CO2 24   *   BUN 7.8*   CR 0.44*   GFRESTIMATED >90   MADDIE 10.1     Recent Labs   Lab Test 10/07/23  2240 05/15/23  0603 05/13/23  2145   CR 0.44* 0.46* 0.51     No results for input(s): A1C in the last 33361 hours.       Recent Labs   Lab Test 10/07/23  2240   WBC 6.8   HGB 14.0   HCT 44.1   MCV 89        Recent Labs   Lab Test 10/07/23  2240 06/27/23  1237 05/15/23  0603   HGB 14.0 13.4 14.8    No results for input(s): TROPONINI in the last 13294 hours.  Recent  Labs   Lab Test 05/13/23  2145   NTBNPI 831     Recent Labs   Lab Test 10/07/23  2240   TSH 0.25*     Recent Labs   Lab Test 10/07/23  2240   INR 1.06        Medical History  Surgical History Family History Social History   No past medical history on file.  No past surgical history on file.  No family history on file.     Social History     Socioeconomic History    Marital status:      Spouse name: Not on file    Number of children: Not on file    Years of education: Not on file    Highest education level: Not on file   Occupational History    Not on file   Tobacco Use    Smoking status: Every Day     Types: Cigarettes    Smokeless tobacco: Not on file   Substance and Sexual Activity    Alcohol use: Not on file    Drug use: Not on file    Sexual activity: Not on file   Other Topics Concern    Not on file   Social History Narrative    Not on file     Social Determinants of Health     Financial Resource Strain: Not on file   Food Insecurity: Not on file   Transportation Needs: Not on file   Physical Activity: Not on file   Stress: Not on file   Social Connections: Not on file   Interpersonal Safety: Not on file   Housing Stability: Not on file         Medications  Allergies   Current Outpatient Medications   Medication Sig Dispense Refill    albuterol (PROAIR HFA/PROVENTIL HFA/VENTOLIN HFA) 108 (90 Base) MCG/ACT inhaler Inhale 2 puffs into the lungs every 4 hours as needed for shortness of breath, wheezing or cough 18 g 11    atorvastatin (LIPITOR) 80 MG tablet Take 80 mg by mouth daily      Buprenorphine HCl (BELBUCA) 600 MCG FILM buccal film Place 600 mcg inside cheek every 12 hours      folic acid (FOLVITE) 1 MG tablet Take 1 mg by mouth daily      guaiFENesin (MUCINEX) 600 MG 12 hr tablet Take 2 tablets (1,200 mg) by mouth 2 times daily 20 tablet 1    levofloxacin (LEVAQUIN) 750 MG tablet Take 1 tablet (750 mg) by mouth daily 5 tablet 0    lisinopril (ZESTRIL) 10 MG tablet Take 10 mg by mouth daily       LORazepam (ATIVAN) 0.5 MG tablet Take 0.25 mg by mouth every 8 hours as needed      naproxen (NAPROSYN) 500 MG tablet Take 500 mg by mouth 2 times daily as needed for moderate pain      nystatin (MYCOSTATIN) 853919 UNIT/GM external cream daily as needed      oxyCODONE IR (ROXICODONE) 10 MG tablet Take 5 mg by mouth every 6 hours as needed      predniSONE (DELTASONE) 50 MG tablet Take 1 tablet (50 mg) by mouth daily 4 tablet 0    sertraline (ZOLOFT) 100 MG tablet Take 150 mg by mouth daily          Allergies   Allergen Reactions    Diclofenac Anaphylaxis    Ketorolac Anaphylaxis    Ketorolac Tromethamine Anaphylaxis    Sulfa Antibiotics Anaphylaxis    Cephradine      Other reaction(s): *Unknown    Morphine Nausea         Eduardo Jean-Baptiste MD

## 2023-10-08 NOTE — ED NOTES
Patient denies complaints at this time.  Inquiring how long she will have to stay in the hospital, advised patient to discuss with the MD when he/she comes in.  Patient given sugar and creamer for her coffee.

## 2023-10-08 NOTE — ED NOTES
Bed: JNED-23  Expected date: 10/7/23  Expected time: 10:02 PM  Means of arrival: Ambulance  Comments:  [facility] - COVID +, from [facility].

## 2023-10-08 NOTE — ED NOTES
"Expected Patient Referral to ED  9:46 PM    Referring Clinic/Provider:  [facility]    Reason for referral/Clinical facts:  73-year-old female.  Had a positive COVID test at home.  Presented to the emergency room for shortness of breath.  Not hypoxic or tachypneic but \"working to breathe\" no work-up done there, being sent here for further evaluation    Recommendations provided:  Send to ED for further evaluation    Caller was informed that this institution does possess the capabilities and/or resources to provide for patient and should be transferred to our facility.    Discussed that if direct admit is sought and any hurdles are encountered, this ED would be happy to see the patient and evaluate.    Informed caller that recommendations provided are recommendations based only on the facts provided and that they responsible to accept or reject the advice, or to seek a formal in person consultation as needed and that this ED will see/treat patient should they arrive.      Jessica Gray MD  [facility] EMERGENCY DEPARTMENT  [address] [place] [id number] [phone]       Jessica Gray MD  10/07/23 2147    "

## 2023-10-08 NOTE — DISCHARGE SUMMARY
[facility]  Hospitalist Discharge Summary      Date of Admission:  10/7/2023  Date of Discharge:  10/8/2023  Discharging Provider: Ludwin Cotter MD  Discharge Service: Hospitalist Service    Discharge Diagnoses   #COVID-19 infection     Follow-ups Needed After Discharge   Follow-up Appointments     Follow-up and recommended labs and tests       Follow up with primary care provider, Enma Burrell, within 7 days   for hospital follow- up.  No follow up labs or test are needed. Reach out   to your PCP tomorrow on 10/9/23 to see if you qualify for Paxlovid.        Plan for outpatient MRCP to further evaluate biliary system given ductal dilation    Recheck EKG as outpatient to reassess QTc, if still prolonged would assess Sertraline and other QT prolonging meds    Unresulted Labs Ordered in the Past 30 Days of this Admission       No orders found for last 31 day(s).        These results will be followed up by NA    Discharge Disposition   Discharged to home  Condition at discharge: Stable    Hospital Course   73 year old female admitted on 10/7/2023. She was sent to the emergency department from the urgency room for evaluation of COVID infection and shortness of breath. Was treated with prophylactic dose of remdesivir. On telemetry was thought to have NSVT and possible torsades, so cardiology was consulted for evaluation. Patient was seen by cardiology and telemetry pattern was reviewed and was noted to be artifact. Patient was placed on O2 but appears she did not need it and was taken off and maintained sats > 92%. Patient discharged home.    #COVID-19 infection  -Symptoms of headache, jaw pain, nausea and vomiting onset 10/7/2023  -Vaccinated and boosted  -CTA chest negative for PE but bilateral lower lobe atelectasis  -CRP inflammation 10.7, D-dimer 1.49    #NSVT ruled out  #Prolonged QTc 502  -CT chest showed cardiomegaly with moderate coronary atherosclerotic disease  -High-sensitivity  troponin T not elevated    #Hypertensive urgency  #Essential hypertension  -RPTA medications    #COPD  No bronchospasms or signs of acute exacerbation  -At risk for decompensation secondary to COVID-19 infection  -Scheduled Combivent inhalers  -Albuterol HFA as needed    #Hypophosphatemia, mild  -Replaced    #Low TSH  -Free T4 within normal limits, subclinical  -Outpatient follow-up    #Tobacco abuse  -Smokes 1 pack/day  -Cessation education    #Hyperlipidemia  -Continue PTA statins    #Anxiety  -Supportive management  -Continue PTA medications    #Chronic pain  #Opioid dependence  -Follows at the pain clinic  -Reconcile PTA medications    #Radiographic findings  -Mild intrahepatic and extrahepatic biliary duct dilatation, CBD up to 11 mm, pancreatic duct up to 5 mm  -1.4 cm cystic lesion in the tail of the pancreas, unchanged from 5/13/2023  -Nonobstructive left lower pole collecting system calculi, the largest of which measures 4.5 mm  -Elevated alk phos but normal bilirubin and transaminases  -Outpatient follow-up (MRCP ordered through primary provider but not done yet)    Consultations This Hospital Stay   CARDIOLOGY IP CONSULT    Code Status   Full Code    Time Spent on this Encounter   I, Ludwin Cotter MD, personally saw the patient today and spent greater than 30 minutes discharging this patient.       Ludwin Cotter MD  [facility] EMERGENCY DEPARTMENT  [facility] [facility] [address]  Phone: [phone]  ______________________________________________________________________    Physical Exam   Vital Signs: Temp: 98.4  F (36.9  C) Temp src: Oral BP: (!) 168/90 Pulse: 99   Resp: 28 SpO2: 95 % O2 Device: Nasal cannula Oxygen Delivery: 2 LPM  Weight: 171 lbs 0 oz    General: no acute distress  CV: unable to auscultate due to PPE, no pitting edema  Respiratory: unable to auscultate due to PPE, no respiratory distress  GI: soft, no guarding, NT, ND  Neuro: alert        Primary Care Physician   Enma Burrell    Discharge Orders      Reason for your hospital stay    COVID-19 and it was thought you had an abnormal heart rhythm but upon the cardiologist reviewing it, it was felt it was just a false reading that was picked up, called artifact.     Follow-up and recommended labs and tests     Follow up with primary care provider, Enma Burrell, within 7 days for hospital follow- up.  No follow up labs or test are needed. Reach out to your PCP tomorrow on 10/9/23 to see if you qualify for Paxlovid.     Activity    Your activity upon discharge: activity as tolerated     Diet    Follow this diet upon discharge: Low Saturated Fat Na <2400mg Diet       Significant Results and Procedures   Most Recent 3 CBC's:  Recent Labs   Lab Test 10/07/23  2240 06/27/23  1237 05/15/23  0603 05/13/23  2145   WBC 6.8  --  15.1* 11.4*   HGB 14.0 13.4 14.8 14.4   MCV 89  --  90 90     --  198 160     Most Recent 3 BMP's:  Recent Labs   Lab Test 10/07/23  2240 05/15/23  0603 05/13/23  2145    140 136   POTASSIUM 3.9 4.0 3.9   CHLORIDE 103 103 102   CO2 24 27 27   BUN 7.8* 12.8 9.8   CR 0.44* 0.46* 0.51   ANIONGAP 11 10 7   MADDIE 10.1 10.8* 10.4*   * 128* 109*     Most Recent 2 LFT's:  Recent Labs   Lab Test 10/07/23  2240 05/13/23  2145   AST 40 40*   ALT 37 33   ALKPHOS 137* 117*   BILITOTAL 0.3 0.3     Most Recent 3 INR's:  Recent Labs   Lab Test 10/07/23  2240   INR 1.06   ,   Results for orders placed or performed during the hospital encounter of 10/07/23   CT Chest Pulmonary Embolism w Contrast    Narrative    EXAM: CT CHEST PULMONARY EMBOLISM W CONTRAST  LOCATION: [facility]  DATE: 10/8/2023    INDICATION: covid dyspnea  COMPARISON: None.  TECHNIQUE: CT chest pulmonary angiogram during arterial phase injection of IV contrast. Multiplanar reformats and MIP reconstructions were performed. Dose reduction techniques were used.   CONTRAST: 90 ml iso  370    FINDINGS:  ANGIOGRAM CHEST: Pulmonary arteries are normal caliber and negative for pulmonary emboli. Thoracic aorta is negative for dissection. No CT evidence of right heart strain.    LUNGS AND PLEURA: Mild emphysematous changes of lungs are seen bilaterally. Discoid atelectasis is seen in the lower lobes bilaterally (image 62, series 9; image 78, series 9). Mild dependent atelectatic changes are also present in the lower lobes   bilaterally. No pleural effusion or pneumothorax is identified.     MEDIASTINUM/AXILLAE: The heart is mildly enlarged. The aorta is normal in size and caliber. No worrisome mediastinal lymphadenopathy is identified.    CORONARY ARTERY CALCIFICATION: Moderate.    UPPER ABDOMEN: Normal.    MUSCULOSKELETAL: Normal.      Impression    IMPRESSION:  1.  No evidence of pulmonary embolus to the subsegmental level.  2.  Mild emphysematous changes of the lungs bilaterally with Discoid bilateral lower lobe atelectasis  3.  cardiomegaly with moderate coronary atherosclerotic disease, correlate with cardiac function   CT Abdomen Pelvis w Contrast    Narrative    EXAM: CT ABDOMEN PELVIS W CONTRAST  LOCATION: [facility]  DATE: 10/8/2023    INDICATION: abdominal pain  COMPARISON: CT dated 05/13/2023  TECHNIQUE: CT scan of the abdomen and pelvis was performed following injection of IV contrast. Multiplanar reformats were obtained. Dose reduction techniques were used.  CONTRAST: 90 ml iso 370    FINDINGS:   LOWER CHEST: Please refer to CT chest report obtained at the same time.    HEPATOBILIARY: Mild intra and extrahepatic biliary duct dilatation. The common bile duct measures 11 mm in diameter. No calcified ductal stones are identified. The gallbladder is unremarkable.    PANCREAS: The previously seen 1.4 cm cystic lesion in the tail of the pancreas is again identified, currently measuring by 14 mm, unchanged from prior exam a lesser. The remainder the pancreas is noted.  Pancreatic duct is mildly dilated measuring 5 mm in   the pancreatic head (image 66, series 3).    SPLEEN: Normal size.    ADRENAL GLANDS: No significant nodules.    KIDNEYS/BLADDER: No significant mass or hydronephrosis. Nonobstructive left lower pole collecting system calculi are again identified, the largest of which measures 4.5 mm in diameter, unchanged from prior exam. There is no evidence of hydronephrosis or   hydroureter. The bladder is unremarkable. There are simple or benign cysts. No follow up is needed.    BOWEL: No obstruction or inflammatory change. The appendix is normal.    LYMPH NODES: No lymphadenopathy.    VASCULATURE: No abdominal aortic aneurysm.    PELVIC ORGANS: No pelvic masses.    MUSCULOSKELETAL: Multilevel discogenic and posterior facet degenerative changes are seen throughout the lumbar spine, similar to prior exam.      Impression    IMPRESSION:   1.  Mild intrahepatic and extra hepatic biliary duct dilatation with dilatation of the common bile duct up to 11 mm diameter, as well as mild dilatation of the pancreatic duct up to 5 mm in diameter. No calcified ductal stones or pancreatic head masses   are identified, correlation with patient's legal symptomatology and liver enzymes is recommended, a noncalcified ductal stone or stricture cannot be excluded, if further evaluation is warranted MRCP or ERCP can be obtained.  2.  Redemonstration of the 1.4 cm cystic lesion in the tail the pancreas, unchanged when compared to 05/13/2023, this could reflect a small IPM recommend further evaluation with MRI MRCP for more complete evaluation.  3.  Redemonstration of nonobstructive left lower pole collecting system calculi, the largest of which measures 4.5 mm       Discharge Medications   Current Discharge Medication List        CONTINUE these medications which have NOT CHANGED    Details   albuterol (PROAIR HFA/PROVENTIL HFA/VENTOLIN HFA) 108 (90 Base) MCG/ACT inhaler Inhale 2 puffs into the lungs  every 4 hours as needed for shortness of breath, wheezing or cough  Qty: 18 g, Refills: 11    Comments: Pharmacy may dispense brand covered by insurance (Proair, or proventil or ventolin or generic albuterol inhaler)  Associated Diagnoses: COPD exacerbation (H)      atorvastatin (LIPITOR) 80 MG tablet Take 80 mg by mouth daily      buprenorphine (SUBUTEX) 2 MG SUBL sublingual tablet Place 2 mg under the tongue 3 times daily      esomeprazole (NEXIUM) 20 MG DR capsule Take 20 mg by mouth every morning (before breakfast)      folic acid (FOLVITE) 1 MG tablet Take 1 mg by mouth daily      lisinopril (ZESTRIL) 10 MG tablet Take 10 mg by mouth daily      LORazepam (ATIVAN) 0.5 MG tablet Take 0.25 mg by mouth every 8 hours as needed for anxiety      oxyCODONE IR (ROXICODONE) 10 MG tablet Take 5 mg by mouth every 6 hours as needed for pain      sertraline (ZOLOFT) 100 MG tablet Take 150 mg by mouth daily           Allergies   Allergies   Allergen Reactions    Diclofenac Anaphylaxis    Ketorolac Anaphylaxis    Ketorolac Tromethamine Anaphylaxis    Sulfa Antibiotics Anaphylaxis    Cephradine      Other reaction(s): *Unknown    Morphine Nausea

## 2023-10-08 NOTE — ED TRIAGE NOTES
Pt presents to ED via EMS from urgent care c/o stomach pain, nausea and headache. She tested positive for Covid today. ABCs intact.      Triage Assessment       Row Name 10/07/23 5644       Triage Assessment (Adult)    Airway WDL WDL       Respiratory WDL    Respiratory WDL WDL       Cardiac WDL    Cardiac WDL WDL       Cognitive/Neuro/Behavioral WDL    Cognitive/Neuro/Behavioral WDL WDL

## 2023-10-08 NOTE — PHARMACY-ADMISSION MEDICATION HISTORY
Pharmacy Intern Admission Medication History    Admission medication history is complete. The information provided in this note is only as accurate as the sources available at the time of the update.    Information Source(s): Patient via in-person    Pertinent Information: Patient states that the last time she had any of her medications was yesterday. She had emesis shortly after taking the medications. The buprenorphine SL tablet did dissolve prior to emesis.     Changes made to PTA medication list:  Added: Esomeprazole  Deleted: Mucinex, Levaquin, Naproxen, Mycostatin, Deltasone  Changed: Burprenorphine from Belbuca --> SL tablets    Medication Affordability:  Not including over the counter (OTC) medications, was there a time in the past 3 months when you did not take your medications as prescribed because of cost?: No    Allergies reviewed with patient and updates made in EHR: yes    Medication History Completed By: Jamari Donnelly 10/8/2023 10:02 AM    PTA Med List   Medication Sig Last Dose    albuterol (PROAIR HFA/PROVENTIL HFA/VENTOLIN HFA) 108 (90 Base) MCG/ACT inhaler Inhale 2 puffs into the lungs every 4 hours as needed for shortness of breath, wheezing or cough 10/7/2023 at am    atorvastatin (LIPITOR) 80 MG tablet Take 80 mg by mouth daily 10/7/2023 at am    buprenorphine (SUBUTEX) 2 MG SUBL sublingual tablet Place 2 mg under the tongue 3 times daily 10/7/2023 at am    esomeprazole (NEXIUM) 20 MG DR capsule Take 20 mg by mouth every morning (before breakfast) 10/7/2023 at am    folic acid (FOLVITE) 1 MG tablet Take 1 mg by mouth daily 10/7/2023 at am    lisinopril (ZESTRIL) 10 MG tablet Take 10 mg by mouth daily 10/7/2023 at am    LORazepam (ATIVAN) 0.5 MG tablet Take 0.25 mg by mouth every 8 hours as needed for anxiety Past Month    oxyCODONE IR (ROXICODONE) 10 MG tablet Take 5 mg by mouth every 6 hours as needed for pain 10/7/2023 at am    sertraline (ZOLOFT) 100 MG tablet Take 150 mg by mouth daily  10/7/2023 at am

## 2023-10-13 LAB
ATRIAL RATE - MUSE: 92 BPM
DIASTOLIC BLOOD PRESSURE - MUSE: 102 MMHG
INTERPRETATION ECG - MUSE: NORMAL
P AXIS - MUSE: 64 DEGREES
PR INTERVAL - MUSE: 154 MS
QRS DURATION - MUSE: 124 MS
QT - MUSE: 406 MS
QTC - MUSE: 502 MS
R AXIS - MUSE: 47 DEGREES
SYSTOLIC BLOOD PRESSURE - MUSE: 183 MMHG
T AXIS - MUSE: 68 DEGREES
VENTRICULAR RATE- MUSE: 92 BPM

## 2024-01-06 ENCOUNTER — HEALTH MAINTENANCE LETTER (OUTPATIENT)
Age: 74
End: 2024-01-06

## 2025-01-25 ENCOUNTER — HEALTH MAINTENANCE LETTER (OUTPATIENT)
Age: 75
End: 2025-01-25

## 2025-02-22 ENCOUNTER — HEALTH MAINTENANCE LETTER (OUTPATIENT)
Age: 75
End: 2025-02-22

## (undated) RX ORDER — LIDOCAINE HYDROCHLORIDE 10 MG/ML
INJECTION, SOLUTION EPIDURAL; INFILTRATION; INTRACAUDAL; PERINEURAL
Status: DISPENSED
Start: 2021-09-28

## (undated) RX ORDER — LIDOCAINE HYDROCHLORIDE 10 MG/ML
INJECTION, SOLUTION EPIDURAL; INFILTRATION; INTRACAUDAL; PERINEURAL
Status: DISPENSED
Start: 2021-01-12

## (undated) RX ORDER — TRIAMCINOLONE ACETONIDE 40 MG/ML
INJECTION, SUSPENSION INTRA-ARTICULAR; INTRAMUSCULAR
Status: DISPENSED
Start: 2021-09-28

## (undated) RX ORDER — TRIAMCINOLONE ACETONIDE 40 MG/ML
INJECTION, SUSPENSION INTRA-ARTICULAR; INTRAMUSCULAR
Status: DISPENSED
Start: 2021-06-03

## (undated) RX ORDER — TRIAMCINOLONE ACETONIDE 40 MG/ML
INJECTION, SUSPENSION INTRA-ARTICULAR; INTRAMUSCULAR
Status: DISPENSED
Start: 2021-01-12

## (undated) RX ORDER — TRIAMCINOLONE ACETONIDE 40 MG/ML
INJECTION, SUSPENSION INTRA-ARTICULAR; INTRAMUSCULAR
Status: DISPENSED
Start: 2020-09-01

## (undated) RX ORDER — BUPIVACAINE HYDROCHLORIDE 2.5 MG/ML
INJECTION, SOLUTION EPIDURAL; INFILTRATION; INTRACAUDAL
Status: DISPENSED
Start: 2021-01-12

## (undated) RX ORDER — BUPIVACAINE HYDROCHLORIDE 2.5 MG/ML
INJECTION, SOLUTION EPIDURAL; INFILTRATION; INTRACAUDAL
Status: DISPENSED
Start: 2021-09-28

## (undated) RX ORDER — BUPIVACAINE HYDROCHLORIDE 2.5 MG/ML
INJECTION, SOLUTION EPIDURAL; INFILTRATION; INTRACAUDAL
Status: DISPENSED
Start: 2021-06-03

## (undated) RX ORDER — BUPIVACAINE HYDROCHLORIDE 2.5 MG/ML
INJECTION, SOLUTION EPIDURAL; INFILTRATION; INTRACAUDAL
Status: DISPENSED
Start: 2020-09-01

## (undated) RX ORDER — LIDOCAINE HYDROCHLORIDE 10 MG/ML
INJECTION, SOLUTION EPIDURAL; INFILTRATION; INTRACAUDAL; PERINEURAL
Status: DISPENSED
Start: 2020-09-01

## (undated) RX ORDER — LIDOCAINE HYDROCHLORIDE 10 MG/ML
INJECTION, SOLUTION EPIDURAL; INFILTRATION; INTRACAUDAL; PERINEURAL
Status: DISPENSED
Start: 2021-06-03